# Patient Record
Sex: FEMALE | Race: WHITE | Employment: FULL TIME | URBAN - NONMETROPOLITAN AREA
[De-identification: names, ages, dates, MRNs, and addresses within clinical notes are randomized per-mention and may not be internally consistent; named-entity substitution may affect disease eponyms.]

---

## 2017-10-11 ENCOUNTER — OFFICE VISIT (OUTPATIENT)
Dept: FAMILY MEDICINE CLINIC | Age: 37
End: 2017-10-11

## 2017-10-11 VITALS
TEMPERATURE: 98.6 F | DIASTOLIC BLOOD PRESSURE: 86 MMHG | WEIGHT: 190.1 LBS | OXYGEN SATURATION: 96 % | HEIGHT: 66 IN | BODY MASS INDEX: 30.55 KG/M2 | SYSTOLIC BLOOD PRESSURE: 118 MMHG | HEART RATE: 98 BPM

## 2017-10-11 DIAGNOSIS — E89.41 HOT FLASHES DUE TO SURGICAL MENOPAUSE: ICD-10-CM

## 2017-10-11 DIAGNOSIS — N64.4 BREAST PAIN, RIGHT: ICD-10-CM

## 2017-10-11 DIAGNOSIS — M25.512 ACUTE PAIN OF LEFT SHOULDER: Primary | ICD-10-CM

## 2017-10-11 DIAGNOSIS — M25.612 DECREASED ROM OF LEFT SHOULDER: ICD-10-CM

## 2017-10-11 PROCEDURE — 99213 OFFICE O/P EST LOW 20 MIN: CPT | Performed by: NURSE PRACTITIONER

## 2017-10-11 RX ORDER — TRAMADOL HYDROCHLORIDE 50 MG/1
50 TABLET ORAL EVERY 8 HOURS PRN
Qty: 15 TABLET | Refills: 0 | Status: SHIPPED | OUTPATIENT
Start: 2017-10-11 | End: 2017-10-21

## 2017-10-11 RX ORDER — IBUPROFEN 800 MG/1
800 TABLET ORAL EVERY 8 HOURS PRN
Qty: 60 TABLET | Refills: 3 | Status: SHIPPED | OUTPATIENT
Start: 2017-10-11 | End: 2019-01-19 | Stop reason: ALTCHOICE

## 2017-10-11 ASSESSMENT — ENCOUNTER SYMPTOMS
SHORTNESS OF BREATH: 0
COUGH: 0

## 2017-10-11 NOTE — PROGRESS NOTES
Subjective  Chief Complaint   Patient presents with    Establish Care    Shoulder Pain     saw physician in [place], having LT shoulder pain for about 15 years    Breast Pain     RT breast, has been having pain for over 2 years, no noticed masses    Discuss Medications     hot flashes since hysterectomy     Establish care  Presents today to [facility]. Previous PCP was in [place]. Last EKG was normal, Last stress test was never Last mammogram was 2012. She would also like to discuss shoulder pain and right breast pain. Overall is doing well. Has no other questions or concerns at this time. Shoulder Pain    The pain is present in the left shoulder. This is a recurrent problem. The current episode started more than 1 year ago. There has been a history of trauma. The problem occurs constantly. The problem has been gradually worsening. The quality of the pain is described as pounding, sharp and aching. The pain is at a severity of 3/10. The pain is mild. Associated symptoms include a limited range of motion. Pertinent negatives include no fever, joint locking, joint swelling, numbness, stiffness or tingling. The symptoms are aggravated by activity. She has tried NSAIDS (Biofreeze, Ibuprofen 800mg) for the symptoms. The treatment provided mild relief. Was lifting a bag of mortar at work when she developed severe left shoulder pain. Aware that if being seen here, this will never be able to be submitted to worker's comp. Says she will not submit to worker's comp for this. Has also been having intermittent right breast pain. Says it is off and on, will notice it when she is in the shower or bumps it. Denies any lumps or bumps that she can notice. Does have implants so admits there are some \"ripples\". Has also been having hot flashes since her hysterectomy. Says she used to be on estradiol and it helped. There are no active problems to display for this patient.     Past Medical History: Diagnosis Date    Bacterial meningitis     Cancer ([facility]     uterine, ovarian    Von Willebrand disease ([facility]      Past Surgical History:   Procedure Laterality Date    BREAST ENHANCEMENT SURGERY  1999    HYSTERECTOMY, TOTAL ABDOMINAL  2007    LAPAROSCOPY       Family History   Problem Relation Age of Onset    Depression Mother     Diabetes Mother     Heart Disease Mother     High Blood Pressure Mother     High Cholesterol Mother     Asthma Mother     Heart Attack Father     Depression Brother     Diabetes Brother     High Cholesterol Brother     Heart Attack Paternal Aunt     Heart Attack Paternal Uncle     Depression Maternal Grandmother     Diabetes Maternal Grandmother     Heart Attack Maternal Grandmother     Heart Attack Maternal Grandfather     Depression Brother     Diabetes Brother      Social History     Social History    Marital status: Single     Spouse name: N/A    Number of children: N/A    Years of education: N/A     Social History Main Topics    Smoking status: Current Every Day Smoker     Types: Cigarettes     Start date: 10/11/1992    Smokeless tobacco: Former User    Alcohol use Yes      Comment: socially    Drug use: No    Sexual activity: No     Other Topics Concern    None     Social History Narrative    None     No current outpatient prescriptions on file prior to visit. No current facility-administered medications on file prior to visit. Allergies   Allergen Reactions    Acetomenaphthone (Menadiol Diacetate) [Menadiol Sodium Diphosphate]     Bee Venom     Penicillins        Review of Systems   Constitutional: Positive for diaphoresis. Negative for chills, fatigue and fever. Respiratory: Negative for cough and shortness of breath. Cardiovascular: Negative for chest pain, palpitations and leg swelling. Musculoskeletal: Positive for arthralgias (left shoulder). Negative for stiffness. Neurological: Negative for tingling and numbness. referral to Obstetrics / Gynecology   4. Decreased ROM of left shoulder  MRI Shoulder Left Wo Contrast     Xray and MRI left shoulder as ordered. Referral to OB to discuss hot flashes. Mammogram and Us of right breast as ordered to further evaluate tenderness. Ibuprofen TID PRN. Tramadol sparingly for pain. F/u PRN. Controlled Substances Monitoring:     Attestation: The Prescription Monitoring Report for this patient was reviewed today. (Rakesh Cobian CNP)  Documentation: No signs of potential drug abuse or diversion identified.  (Rakesh Cobian CNP)        Orders Placed This Encounter   Procedures    FRANK DIGITAL DIAGNOSTIC RIGHT     Standing Status:   Future     Standing Expiration Date:   12/11/2018     Order Specific Question:   Reason for exam:     Answer:   right breast pain and tenderness    US BREAST LIMITED RIGHT     Standing Status:   Future     Standing Expiration Date:   10/11/2018     Order Specific Question:   Reason for exam:     Answer:   right breast pain    XR SHOULDER LEFT (MIN 2 VIEWS)     Standing Status:   Future     Number of Occurrences:   1     Standing Expiration Date:   10/11/2018     Order Specific Question:   Reason for exam:     Answer:   left shoulder pain and injury    MRI Shoulder Left Wo Contrast     Standing Status:   Future     Standing Expiration Date:   10/11/2018     Order Specific Question:   Reason for exam:     Answer:   left shoulder pain and decreased rom   Deon Jarrett Ambulatory referral to Obstetrics / Gynecology     Referral Priority:   Routine     Referral Type:   Consult for Advice and Opinion     Referral Reason:   Specialty Services Required     Referred to Provider:   Adonis Oliveira MD     Requested Specialty:   Obstetrics & Gynecology     Number of Visits Requested:   1       Orders Placed This Encounter   Medications    ibuprofen (ADVIL;MOTRIN) 800 MG tablet     Sig: Take 1 tablet by mouth every 8 hours as needed for Pain     Dispense:  60 tablet Refill:  3    traMADol (ULTRAM) 50 MG tablet     Sig: Take 1 tablet by mouth every 8 hours as needed for Pain     Dispense:  15 tablet     Refill:  0       Return if symptoms worsen or fail to improve.     Krysta Toro, CNP

## 2017-10-12 ENCOUNTER — TELEPHONE (OUTPATIENT)
Dept: FAMILY MEDICINE CLINIC | Age: 37
End: 2017-10-12

## 2017-10-12 DIAGNOSIS — F40.240 CLAUSTROPHOBIA: Primary | ICD-10-CM

## 2017-10-12 NOTE — TELEPHONE ENCOUNTER
Pt calling they have her MRI scheduled on 10/24/2017 at 2 pm. They were talking with her and suggested she call for something to help her relax due to claustrophobia .  Pt uses [employer] pt can be reached at [phone]

## 2017-10-13 RX ORDER — LORAZEPAM 0.5 MG/1
0.5 TABLET ORAL ONCE
Qty: 1 TABLET | Refills: 0 | Status: SHIPPED | OUTPATIENT
Start: 2017-10-13 | End: 2017-10-13

## 2017-10-24 ENCOUNTER — HOSPITAL ENCOUNTER (OUTPATIENT)
Dept: ULTRASOUND IMAGING | Age: 37
Discharge: HOME | End: 2017-10-24
Payer: COMMERCIAL

## 2017-10-24 ENCOUNTER — HOSPITAL ENCOUNTER (OUTPATIENT)
Dept: WOMENS IMAGING | Age: 37
Discharge: HOME | End: 2017-10-24
Payer: COMMERCIAL

## 2017-10-24 ENCOUNTER — HOSPITAL ENCOUNTER (OUTPATIENT)
Dept: MRI IMAGING | Age: 37
Discharge: HOME | End: 2017-10-24
Payer: COMMERCIAL

## 2017-10-24 DIAGNOSIS — N64.4 BREAST PAIN, RIGHT: ICD-10-CM

## 2017-10-24 DIAGNOSIS — M25.612 DECREASED ROM OF LEFT SHOULDER: ICD-10-CM

## 2017-10-24 PROCEDURE — 76642 ULTRASOUND BREAST LIMITED: CPT

## 2017-10-24 PROCEDURE — G0279 TOMOSYNTHESIS, MAMMO: HCPCS

## 2017-10-24 NOTE — PROGRESS NOTES
Patient was unable to cooperate for MRI left shoulder. Patient was restless, unable to hold still, moving legs, lifting arm out of positioning  Cushion. Patient seems to appear to have ETOH on breath. This was apparent while conversing with patient and when she exhaled. MRI exam was not able to be completed due patients inability to hold still. While walking patient back to waiting area patient was unsteady in gait and almost fell over trying to put shoes back on. I asked patient if she was ok and she became angry stating \"I am not on drugs, my doctor only gave me .5 mg, I am not on drugs. \" Patient was walked back to Radiology waiting room where her friend Luz Singh was waiting for her to take her home.

## 2017-10-25 DIAGNOSIS — N64.4 BREAST PAIN, RIGHT: Primary | ICD-10-CM

## 2019-01-19 ENCOUNTER — HOSPITAL ENCOUNTER (OUTPATIENT)
Age: 39
Setting detail: OBSERVATION
Discharge: HOME | End: 2019-01-20
Attending: EMERGENCY MEDICINE | Admitting: INTERNAL MEDICINE
Payer: MEDICAID

## 2019-01-19 ENCOUNTER — APPOINTMENT (OUTPATIENT)
Dept: CT IMAGING | Age: 39
End: 2019-01-19
Payer: MEDICAID

## 2019-01-19 DIAGNOSIS — T18.9XXA SWALLOWED FOREIGN BODY, INITIAL ENCOUNTER: Primary | ICD-10-CM

## 2019-01-19 LAB
A/G RATIO: 1.6 (ref 1.1–2.2)
ALBUMIN SERPL-MCNC: 4.7 G/DL (ref 3.4–5)
ALP BLD-CCNC: 78 U/L (ref 40–129)
ALT SERPL-CCNC: 31 U/L (ref 10–40)
ANION GAP SERPL CALCULATED.3IONS-SCNC: 11 MMOL/L (ref 3–16)
AST SERPL-CCNC: 33 U/L (ref 15–37)
BASOPHILS ABSOLUTE: 0 K/UL (ref 0–0.2)
BASOPHILS RELATIVE PERCENT: 0.5 %
BILIRUB SERPL-MCNC: 1 MG/DL (ref 0–1)
BUN BLDV-MCNC: 6 MG/DL (ref 7–20)
CALCIUM SERPL-MCNC: 9.8 MG/DL (ref 8.3–10.6)
CHLORIDE BLD-SCNC: 103 MMOL/L (ref 99–110)
CO2: 25 MMOL/L (ref 21–32)
CREAT SERPL-MCNC: <0.5 MG/DL (ref 0.6–1.1)
EOSINOPHILS ABSOLUTE: 0.2 K/UL (ref 0–0.6)
EOSINOPHILS RELATIVE PERCENT: 3.4 %
GFR AFRICAN AMERICAN: >60
GFR NON-AFRICAN AMERICAN: >60
GLOBULIN: 3 G/DL
GLUCOSE BLD-MCNC: 89 MG/DL (ref 70–99)
HCG QUALITATIVE: NEGATIVE
HCT VFR BLD CALC: 46.3 % (ref 36–48)
HEMOGLOBIN: 15.8 G/DL (ref 12–16)
LIPASE: 64 U/L (ref 13–60)
LYMPHOCYTES ABSOLUTE: 1.1 K/UL (ref 1–5.1)
LYMPHOCYTES RELATIVE PERCENT: 23.5 %
MCH RBC QN AUTO: 36.5 PG (ref 26–34)
MCHC RBC AUTO-ENTMCNC: 34.1 G/DL (ref 31–36)
MCV RBC AUTO: 107 FL (ref 80–100)
MONOCYTES ABSOLUTE: 0.5 K/UL (ref 0–1.3)
MONOCYTES RELATIVE PERCENT: 10.6 %
NEUTROPHILS ABSOLUTE: 2.9 K/UL (ref 1.7–7.7)
NEUTROPHILS RELATIVE PERCENT: 62 %
PDW BLD-RTO: 13.3 % (ref 12.4–15.4)
PLATELET # BLD: 204 K/UL (ref 135–450)
PMV BLD AUTO: 7.2 FL (ref 5–10.5)
POTASSIUM REFLEX MAGNESIUM: 3.8 MMOL/L (ref 3.5–5.1)
RBC # BLD: 4.33 M/UL (ref 4–5.2)
SODIUM BLD-SCNC: 139 MMOL/L (ref 136–145)
TOTAL PROTEIN: 7.7 G/DL (ref 6.4–8.2)
WBC # BLD: 4.7 K/UL (ref 4–11)

## 2019-01-19 PROCEDURE — 6360000002 HC RX W HCPCS: Performed by: PHYSICIAN ASSISTANT

## 2019-01-19 PROCEDURE — 99285 EMERGENCY DEPT VISIT HI MDM: CPT

## 2019-01-19 PROCEDURE — 2580000003 HC RX 258: Performed by: PHYSICIAN ASSISTANT

## 2019-01-19 PROCEDURE — 6360000002 HC RX W HCPCS: Performed by: EMERGENCY MEDICINE

## 2019-01-19 PROCEDURE — 83690 ASSAY OF LIPASE: CPT

## 2019-01-19 PROCEDURE — 80053 COMPREHEN METABOLIC PANEL: CPT

## 2019-01-19 PROCEDURE — 74177 CT ABD & PELVIS W/CONTRAST: CPT

## 2019-01-19 PROCEDURE — 6360000004 HC RX CONTRAST MEDICATION: Performed by: EMERGENCY MEDICINE

## 2019-01-19 PROCEDURE — 96374 THER/PROPH/DIAG INJ IV PUSH: CPT

## 2019-01-19 PROCEDURE — 96376 TX/PRO/DX INJ SAME DRUG ADON: CPT

## 2019-01-19 PROCEDURE — C9113 INJ PANTOPRAZOLE SODIUM, VIA: HCPCS | Performed by: EMERGENCY MEDICINE

## 2019-01-19 PROCEDURE — 85025 COMPLETE CBC W/AUTO DIFF WBC: CPT

## 2019-01-19 PROCEDURE — G0378 HOSPITAL OBSERVATION PER HR: HCPCS

## 2019-01-19 PROCEDURE — 96375 TX/PRO/DX INJ NEW DRUG ADDON: CPT

## 2019-01-19 PROCEDURE — 84703 CHORIONIC GONADOTROPIN ASSAY: CPT

## 2019-01-19 RX ORDER — LORAZEPAM 2 MG/1
2 TABLET ORAL
Status: DISCONTINUED | OUTPATIENT
Start: 2019-01-19 | End: 2019-01-19

## 2019-01-19 RX ORDER — PANTOPRAZOLE SODIUM 40 MG/10ML
40 INJECTION, POWDER, LYOPHILIZED, FOR SOLUTION INTRAVENOUS DAILY
Status: DISCONTINUED | OUTPATIENT
Start: 2019-01-20 | End: 2019-01-20 | Stop reason: HOSPADM

## 2019-01-19 RX ORDER — POTASSIUM CHLORIDE 20 MEQ/1
40 TABLET, EXTENDED RELEASE ORAL PRN
Status: DISCONTINUED | OUTPATIENT
Start: 2019-01-19 | End: 2019-01-20 | Stop reason: HOSPADM

## 2019-01-19 RX ORDER — METOCLOPRAMIDE HYDROCHLORIDE 5 MG/ML
10 INJECTION INTRAMUSCULAR; INTRAVENOUS EVERY 6 HOURS
Status: DISCONTINUED | OUTPATIENT
Start: 2019-01-19 | End: 2019-01-20 | Stop reason: HOSPADM

## 2019-01-19 RX ORDER — MORPHINE SULFATE 4 MG/ML
4 INJECTION, SOLUTION INTRAMUSCULAR; INTRAVENOUS ONCE
Status: COMPLETED | OUTPATIENT
Start: 2019-01-19 | End: 2019-01-19

## 2019-01-19 RX ORDER — LORAZEPAM 2 MG/1
4 TABLET ORAL
Status: DISCONTINUED | OUTPATIENT
Start: 2019-01-19 | End: 2019-01-19

## 2019-01-19 RX ORDER — MORPHINE SULFATE 4 MG/ML
2 INJECTION, SOLUTION INTRAMUSCULAR; INTRAVENOUS EVERY 4 HOURS PRN
Status: COMPLETED | OUTPATIENT
Start: 2019-01-19 | End: 2019-01-19

## 2019-01-19 RX ORDER — POTASSIUM CHLORIDE 7.45 MG/ML
10 INJECTION INTRAVENOUS PRN
Status: DISCONTINUED | OUTPATIENT
Start: 2019-01-19 | End: 2019-01-20 | Stop reason: HOSPADM

## 2019-01-19 RX ORDER — LORAZEPAM 1 MG/1
1 TABLET ORAL
Status: DISCONTINUED | OUTPATIENT
Start: 2019-01-19 | End: 2019-01-19

## 2019-01-19 RX ORDER — MAGNESIUM SULFATE 1 G/100ML
1 INJECTION INTRAVENOUS PRN
Status: DISCONTINUED | OUTPATIENT
Start: 2019-01-19 | End: 2019-01-20 | Stop reason: HOSPADM

## 2019-01-19 RX ORDER — SODIUM CHLORIDE 0.9 % (FLUSH) 0.9 %
10 SYRINGE (ML) INJECTION PRN
Status: DISCONTINUED | OUTPATIENT
Start: 2019-01-19 | End: 2019-01-20 | Stop reason: HOSPADM

## 2019-01-19 RX ORDER — LORAZEPAM 2 MG/ML
2 INJECTION INTRAMUSCULAR
Status: DISCONTINUED | OUTPATIENT
Start: 2019-01-19 | End: 2019-01-19

## 2019-01-19 RX ORDER — PANTOPRAZOLE SODIUM 40 MG/10ML
80 INJECTION, POWDER, LYOPHILIZED, FOR SOLUTION INTRAVENOUS ONCE
Status: COMPLETED | OUTPATIENT
Start: 2019-01-19 | End: 2019-01-19

## 2019-01-19 RX ORDER — ONDANSETRON 2 MG/ML
4 INJECTION INTRAMUSCULAR; INTRAVENOUS
Status: DISCONTINUED | OUTPATIENT
Start: 2019-01-19 | End: 2019-01-19

## 2019-01-19 RX ORDER — LORAZEPAM 2 MG/ML
3 INJECTION INTRAMUSCULAR
Status: DISCONTINUED | OUTPATIENT
Start: 2019-01-19 | End: 2019-01-19

## 2019-01-19 RX ORDER — POTASSIUM CHLORIDE 20MEQ/15ML
40 LIQUID (ML) ORAL PRN
Status: DISCONTINUED | OUTPATIENT
Start: 2019-01-19 | End: 2019-01-20 | Stop reason: HOSPADM

## 2019-01-19 RX ORDER — LORAZEPAM 2 MG/ML
4 INJECTION INTRAMUSCULAR
Status: DISCONTINUED | OUTPATIENT
Start: 2019-01-19 | End: 2019-01-19

## 2019-01-19 RX ORDER — LORAZEPAM 2 MG/ML
1 INJECTION INTRAMUSCULAR
Status: DISCONTINUED | OUTPATIENT
Start: 2019-01-19 | End: 2019-01-19

## 2019-01-19 RX ORDER — SODIUM CHLORIDE 0.9 % (FLUSH) 0.9 %
10 SYRINGE (ML) INJECTION EVERY 12 HOURS SCHEDULED
Status: DISCONTINUED | OUTPATIENT
Start: 2019-01-19 | End: 2019-01-20 | Stop reason: HOSPADM

## 2019-01-19 RX ADMIN — MORPHINE SULFATE 2 MG: 4 INJECTION INTRAVENOUS at 13:38

## 2019-01-19 RX ADMIN — Medication 10 ML: at 21:10

## 2019-01-19 RX ADMIN — MORPHINE SULFATE 2 MG: 4 INJECTION INTRAVENOUS at 17:28

## 2019-01-19 RX ADMIN — METOCLOPRAMIDE 10 MG: 5 INJECTION, SOLUTION INTRAMUSCULAR; INTRAVENOUS at 13:38

## 2019-01-19 RX ADMIN — MORPHINE SULFATE 4 MG: 4 INJECTION INTRAVENOUS at 10:32

## 2019-01-19 RX ADMIN — PANTOPRAZOLE SODIUM 80 MG: 40 INJECTION, POWDER, FOR SOLUTION INTRAVENOUS at 12:52

## 2019-01-19 RX ADMIN — IOPAMIDOL 75 ML: 755 INJECTION, SOLUTION INTRAVENOUS at 10:54

## 2019-01-19 RX ADMIN — METOCLOPRAMIDE 10 MG: 5 INJECTION, SOLUTION INTRAMUSCULAR; INTRAVENOUS at 21:10

## 2019-01-19 ASSESSMENT — PAIN SCALES - GENERAL
PAINLEVEL_OUTOF10: 7
PAINLEVEL_OUTOF10: 10
PAINLEVEL_OUTOF10: 7
PAINLEVEL_OUTOF10: 0
PAINLEVEL_OUTOF10: 6
PAINLEVEL_OUTOF10: 7
PAINLEVEL_OUTOF10: 7

## 2019-01-19 ASSESSMENT — PAIN DESCRIPTION - LOCATION: LOCATION: ABDOMEN

## 2019-01-19 ASSESSMENT — PAIN DESCRIPTION - PAIN TYPE: TYPE: ACUTE PAIN

## 2019-01-19 ASSESSMENT — PAIN DESCRIPTION - ORIENTATION: ORIENTATION: RIGHT;LEFT

## 2019-01-20 ENCOUNTER — APPOINTMENT (OUTPATIENT)
Dept: GENERAL RADIOLOGY | Age: 39
End: 2019-01-20
Payer: MEDICAID

## 2019-01-20 ENCOUNTER — ANESTHESIA EVENT (OUTPATIENT)
Dept: ENDOSCOPY | Age: 39
End: 2019-01-20
Payer: MEDICAID

## 2019-01-20 ENCOUNTER — ANESTHESIA (OUTPATIENT)
Dept: ENDOSCOPY | Age: 39
End: 2019-01-20
Payer: MEDICAID

## 2019-01-20 VITALS — SYSTOLIC BLOOD PRESSURE: 118 MMHG | OXYGEN SATURATION: 99 % | DIASTOLIC BLOOD PRESSURE: 81 MMHG

## 2019-01-20 VITALS
TEMPERATURE: 97.1 F | BODY MASS INDEX: 23.63 KG/M2 | DIASTOLIC BLOOD PRESSURE: 94 MMHG | WEIGHT: 147 LBS | HEIGHT: 66 IN | RESPIRATION RATE: 16 BRPM | HEART RATE: 69 BPM | OXYGEN SATURATION: 99 % | SYSTOLIC BLOOD PRESSURE: 145 MMHG

## 2019-01-20 PROBLEM — F60.3 BORDERLINE PERSONALITY DISORDER (HCC): Chronic | Status: ACTIVE | Noted: 2019-01-20

## 2019-01-20 PROBLEM — R45.88 NON-SUICIDAL SELF HARM: Chronic | Status: ACTIVE | Noted: 2019-01-20

## 2019-01-20 PROBLEM — F60.3 BORDERLINE PERSONALITY DISORDER (HCC): Status: ACTIVE | Noted: 2019-01-20

## 2019-01-20 LAB
ANION GAP SERPL CALCULATED.3IONS-SCNC: 10 MMOL/L (ref 3–16)
BASOPHILS ABSOLUTE: 0 K/UL (ref 0–0.2)
BASOPHILS RELATIVE PERCENT: 0.6 %
BUN BLDV-MCNC: 7 MG/DL (ref 7–20)
CALCIUM SERPL-MCNC: 9.5 MG/DL (ref 8.3–10.6)
CHLORIDE BLD-SCNC: 104 MMOL/L (ref 99–110)
CO2: 24 MMOL/L (ref 21–32)
CREAT SERPL-MCNC: <0.5 MG/DL (ref 0.6–1.1)
EOSINOPHILS ABSOLUTE: 0.2 K/UL (ref 0–0.6)
EOSINOPHILS RELATIVE PERCENT: 4.1 %
GFR AFRICAN AMERICAN: >60
GFR NON-AFRICAN AMERICAN: >60
GLUCOSE BLD-MCNC: 77 MG/DL (ref 70–99)
HCT VFR BLD CALC: 41.4 % (ref 36–48)
HEMOGLOBIN: 14.2 G/DL (ref 12–16)
LYMPHOCYTES ABSOLUTE: 1.2 K/UL (ref 1–5.1)
LYMPHOCYTES RELATIVE PERCENT: 24.9 %
MAGNESIUM: 1.8 MG/DL (ref 1.8–2.4)
MCH RBC QN AUTO: 36.6 PG (ref 26–34)
MCHC RBC AUTO-ENTMCNC: 34.3 G/DL (ref 31–36)
MCV RBC AUTO: 106.5 FL (ref 80–100)
MONOCYTES ABSOLUTE: 0.5 K/UL (ref 0–1.3)
MONOCYTES RELATIVE PERCENT: 10.2 %
NEUTROPHILS ABSOLUTE: 3 K/UL (ref 1.7–7.7)
NEUTROPHILS RELATIVE PERCENT: 60.2 %
PDW BLD-RTO: 13.2 % (ref 12.4–15.4)
PLATELET # BLD: 158 K/UL (ref 135–450)
PMV BLD AUTO: 7.4 FL (ref 5–10.5)
POTASSIUM REFLEX MAGNESIUM: 3.4 MMOL/L (ref 3.5–5.1)
RBC # BLD: 3.89 M/UL (ref 4–5.2)
SODIUM BLD-SCNC: 138 MMOL/L (ref 136–145)
WBC # BLD: 5 K/UL (ref 4–11)

## 2019-01-20 PROCEDURE — G0500 MOD SEDAT ENDO SERVICE >5YRS: HCPCS | Performed by: INTERNAL MEDICINE

## 2019-01-20 PROCEDURE — 80048 BASIC METABOLIC PNL TOTAL CA: CPT

## 2019-01-20 PROCEDURE — 6370000000 HC RX 637 (ALT 250 FOR IP): Performed by: INTERNAL MEDICINE

## 2019-01-20 PROCEDURE — 3609012900 HC EGD FOREIGN BODY REMOVAL: Performed by: INTERNAL MEDICINE

## 2019-01-20 PROCEDURE — 6360000002 HC RX W HCPCS: Performed by: PHYSICIAN ASSISTANT

## 2019-01-20 PROCEDURE — 99152 MOD SED SAME PHYS/QHP 5/>YRS: CPT | Performed by: INTERNAL MEDICINE

## 2019-01-20 PROCEDURE — 6360000002 HC RX W HCPCS: Performed by: INTERNAL MEDICINE

## 2019-01-20 PROCEDURE — C9113 INJ PANTOPRAZOLE SODIUM, VIA: HCPCS | Performed by: PHYSICIAN ASSISTANT

## 2019-01-20 PROCEDURE — 7100000010 HC PHASE II RECOVERY - FIRST 15 MIN: Performed by: INTERNAL MEDICINE

## 2019-01-20 PROCEDURE — 85025 COMPLETE CBC W/AUTO DIFF WBC: CPT

## 2019-01-20 PROCEDURE — G0378 HOSPITAL OBSERVATION PER HR: HCPCS

## 2019-01-20 PROCEDURE — 6360000002 HC RX W HCPCS: Performed by: ANESTHESIOLOGY

## 2019-01-20 PROCEDURE — 74018 RADEX ABDOMEN 1 VIEW: CPT

## 2019-01-20 PROCEDURE — 2709999900 HC NON-CHARGEABLE SUPPLY: Performed by: INTERNAL MEDICINE

## 2019-01-20 PROCEDURE — 74019 RADEX ABDOMEN 2 VIEWS: CPT

## 2019-01-20 PROCEDURE — C1773 RET DEV, INSERTABLE: HCPCS | Performed by: INTERNAL MEDICINE

## 2019-01-20 PROCEDURE — 2500000003 HC RX 250 WO HCPCS: Performed by: ANESTHESIOLOGY

## 2019-01-20 PROCEDURE — 99255 IP/OBS CONSLTJ NEW/EST HI 80: CPT | Performed by: NURSE PRACTITIONER

## 2019-01-20 PROCEDURE — 99223 1ST HOSP IP/OBS HIGH 75: CPT | Performed by: INTERNAL MEDICINE

## 2019-01-20 PROCEDURE — 2720000010 HC SURG SUPPLY STERILE: Performed by: INTERNAL MEDICINE

## 2019-01-20 PROCEDURE — 7100000011 HC PHASE II RECOVERY - ADDTL 15 MIN: Performed by: INTERNAL MEDICINE

## 2019-01-20 PROCEDURE — 36415 COLL VENOUS BLD VENIPUNCTURE: CPT

## 2019-01-20 PROCEDURE — 83735 ASSAY OF MAGNESIUM: CPT

## 2019-01-20 RX ORDER — KETOROLAC TROMETHAMINE 30 MG/ML
15 INJECTION, SOLUTION INTRAMUSCULAR; INTRAVENOUS EVERY 6 HOURS PRN
Status: DISCONTINUED | OUTPATIENT
Start: 2019-01-20 | End: 2019-01-20 | Stop reason: HOSPADM

## 2019-01-20 RX ORDER — DEXAMETHASONE SODIUM PHOSPHATE 4 MG/ML
INJECTION, SOLUTION INTRA-ARTICULAR; INTRALESIONAL; INTRAMUSCULAR; INTRAVENOUS; SOFT TISSUE PRN
Status: DISCONTINUED | OUTPATIENT
Start: 2019-01-20 | End: 2019-01-20 | Stop reason: SDUPTHER

## 2019-01-20 RX ORDER — PROPOFOL 10 MG/ML
INJECTION, EMULSION INTRAVENOUS PRN
Status: DISCONTINUED | OUTPATIENT
Start: 2019-01-20 | End: 2019-01-20 | Stop reason: SDUPTHER

## 2019-01-20 RX ORDER — ONDANSETRON 2 MG/ML
INJECTION INTRAMUSCULAR; INTRAVENOUS PRN
Status: DISCONTINUED | OUTPATIENT
Start: 2019-01-20 | End: 2019-01-20 | Stop reason: SDUPTHER

## 2019-01-20 RX ORDER — POLYETHYLENE GLYCOL 3350 17 G/17G
17 POWDER, FOR SOLUTION ORAL DAILY
Status: DISCONTINUED | OUTPATIENT
Start: 2019-01-20 | End: 2019-01-20 | Stop reason: HOSPADM

## 2019-01-20 RX ORDER — LIDOCAINE HYDROCHLORIDE 20 MG/ML
INJECTION, SOLUTION EPIDURAL; INFILTRATION; INTRACAUDAL; PERINEURAL PRN
Status: DISCONTINUED | OUTPATIENT
Start: 2019-01-20 | End: 2019-01-20 | Stop reason: SDUPTHER

## 2019-01-20 RX ADMIN — PROPOFOL 50 MG: 10 INJECTION, EMULSION INTRAVENOUS at 09:36

## 2019-01-20 RX ADMIN — KETOROLAC TROMETHAMINE 15 MG: 30 INJECTION, SOLUTION INTRAMUSCULAR; INTRAVENOUS at 12:51

## 2019-01-20 RX ADMIN — POLYETHYLENE GLYCOL 3350 17 G: 17 POWDER, FOR SOLUTION ORAL at 14:00

## 2019-01-20 RX ADMIN — PROPOFOL 50 MG: 10 INJECTION, EMULSION INTRAVENOUS at 09:35

## 2019-01-20 RX ADMIN — METOCLOPRAMIDE 10 MG: 5 INJECTION, SOLUTION INTRAMUSCULAR; INTRAVENOUS at 01:17

## 2019-01-20 RX ADMIN — LIDOCAINE HYDROCHLORIDE 100 MG: 20 INJECTION, SOLUTION EPIDURAL; INFILTRATION; INTRACAUDAL; PERINEURAL at 09:33

## 2019-01-20 RX ADMIN — ONDANSETRON 4 MG: 2 INJECTION, SOLUTION INTRAMUSCULAR; INTRAVENOUS at 09:35

## 2019-01-20 RX ADMIN — DEXAMETHASONE SODIUM PHOSPHATE 8 MG: 4 INJECTION, SOLUTION INTRAMUSCULAR; INTRAVENOUS at 09:35

## 2019-01-20 RX ADMIN — PROPOFOL 50 MG: 10 INJECTION, EMULSION INTRAVENOUS at 09:39

## 2019-01-20 RX ADMIN — PANTOPRAZOLE SODIUM 40 MG: 40 INJECTION, POWDER, FOR SOLUTION INTRAVENOUS at 08:30

## 2019-01-20 RX ADMIN — METOCLOPRAMIDE 10 MG: 5 INJECTION, SOLUTION INTRAMUSCULAR; INTRAVENOUS at 14:00

## 2019-01-20 RX ADMIN — PROPOFOL 70 MG: 10 INJECTION, EMULSION INTRAVENOUS at 09:33

## 2019-01-20 RX ADMIN — METOCLOPRAMIDE 10 MG: 5 INJECTION, SOLUTION INTRAMUSCULAR; INTRAVENOUS at 07:26

## 2019-01-20 ASSESSMENT — PAIN SCALES - GENERAL: PAINLEVEL_OUTOF10: 5

## 2019-01-20 ASSESSMENT — LIFESTYLE VARIABLES: SMOKING_STATUS: 1

## 2019-01-20 ASSESSMENT — PAIN - FUNCTIONAL ASSESSMENT: PAIN_FUNCTIONAL_ASSESSMENT: 0-10

## 2019-01-21 PROCEDURE — 99238 HOSP IP/OBS DSCHRG MGMT 30/<: CPT | Performed by: INTERNAL MEDICINE

## 2019-01-25 ENCOUNTER — APPOINTMENT (OUTPATIENT)
Dept: CT IMAGING | Age: 39
End: 2019-01-25

## 2019-01-25 ENCOUNTER — HOSPITAL ENCOUNTER (EMERGENCY)
Age: 39
Discharge: HOME | End: 2019-01-25
Attending: EMERGENCY MEDICINE

## 2019-01-25 VITALS
OXYGEN SATURATION: 99 % | RESPIRATION RATE: 16 BRPM | DIASTOLIC BLOOD PRESSURE: 91 MMHG | WEIGHT: 150 LBS | HEART RATE: 59 BPM | TEMPERATURE: 98.8 F | BODY MASS INDEX: 24.11 KG/M2 | SYSTOLIC BLOOD PRESSURE: 135 MMHG | HEIGHT: 66 IN

## 2019-01-25 DIAGNOSIS — R51.9 ACUTE NONINTRACTABLE HEADACHE, UNSPECIFIED HEADACHE TYPE: ICD-10-CM

## 2019-01-25 DIAGNOSIS — S09.90XA INJURY OF HEAD, INITIAL ENCOUNTER: Primary | ICD-10-CM

## 2019-01-25 PROCEDURE — 70450 CT HEAD/BRAIN W/O DYE: CPT

## 2019-01-25 PROCEDURE — 6370000000 HC RX 637 (ALT 250 FOR IP): Performed by: EMERGENCY MEDICINE

## 2019-01-25 PROCEDURE — 99284 EMERGENCY DEPT VISIT MOD MDM: CPT

## 2019-01-25 PROCEDURE — 72125 CT NECK SPINE W/O DYE: CPT

## 2019-01-25 PROCEDURE — 70486 CT MAXILLOFACIAL W/O DYE: CPT

## 2019-01-25 RX ORDER — PENICILLIN V POTASSIUM 250 MG/1
250 TABLET ORAL 4 TIMES DAILY
COMMUNITY
End: 2019-01-25 | Stop reason: CLARIF

## 2019-01-25 RX ORDER — ONDANSETRON 4 MG/1
4 TABLET, ORALLY DISINTEGRATING ORAL ONCE
Status: DISCONTINUED | OUTPATIENT
Start: 2019-01-25 | End: 2019-01-26 | Stop reason: HOSPADM

## 2019-01-25 RX ORDER — IBUPROFEN 400 MG/1
400 TABLET ORAL ONCE
Status: COMPLETED | OUTPATIENT
Start: 2019-01-25 | End: 2019-01-25

## 2019-01-25 RX ADMIN — IBUPROFEN 400 MG: 400 TABLET ORAL at 23:22

## 2019-01-25 ASSESSMENT — PAIN DESCRIPTION - LOCATION: LOCATION: HEAD;NECK

## 2019-01-25 ASSESSMENT — PAIN DESCRIPTION - FREQUENCY: FREQUENCY: CONTINUOUS

## 2019-01-25 ASSESSMENT — PAIN DESCRIPTION - PAIN TYPE: TYPE: ACUTE PAIN

## 2019-01-25 ASSESSMENT — PAIN SCALES - GENERAL: PAINLEVEL_OUTOF10: 10

## 2020-05-13 ENCOUNTER — HOSPITAL ENCOUNTER (EMERGENCY)
Facility: HOSPITAL | Age: 40
Discharge: HOME | End: 2020-05-14
Attending: EMERGENCY MEDICINE | Admitting: EMERGENCY MEDICINE

## 2020-05-13 DIAGNOSIS — F17.200 TOBACCO DEPENDENCE: ICD-10-CM

## 2020-05-13 DIAGNOSIS — F10.920 ALCOHOLIC INTOXICATION WITHOUT COMPLICATION (HCC): Primary | ICD-10-CM

## 2020-05-13 DIAGNOSIS — F10.21 HISTORY OF ALCOHOLISM (HCC): ICD-10-CM

## 2020-05-13 DIAGNOSIS — F10.10 ALCOHOL ABUSE: ICD-10-CM

## 2020-05-13 LAB
ALBUMIN SERPL-MCNC: 4.4 G/DL (ref 3.5–5.2)
ALBUMIN/GLOB SERPL: 1.2 G/DL
ALP SERPL-CCNC: 108 U/L (ref 39–117)
ALT SERPL W P-5'-P-CCNC: 143 U/L (ref 1–33)
ANION GAP SERPL CALCULATED.3IONS-SCNC: 19 MMOL/L (ref 5–15)
AST SERPL-CCNC: 115 U/L (ref 1–32)
BASOPHILS # BLD AUTO: 0.1 10*3/MM3 (ref 0–0.2)
BASOPHILS NFR BLD AUTO: 1.5 % (ref 0–1.5)
BILIRUB SERPL-MCNC: <0.2 MG/DL (ref 0.2–1.2)
BUN BLD-MCNC: 9 MG/DL (ref 6–20)
BUN/CREAT SERPL: 12.5 (ref 7–25)
CALCIUM SPEC-SCNC: 8.9 MG/DL (ref 8.6–10.5)
CHLORIDE SERPL-SCNC: 104 MMOL/L (ref 98–107)
CO2 SERPL-SCNC: 23 MMOL/L (ref 22–29)
CREAT BLD-MCNC: 0.72 MG/DL (ref 0.57–1)
DEPRECATED RDW RBC AUTO: 47.8 FL (ref 37–54)
EOSINOPHIL # BLD AUTO: 0.13 10*3/MM3 (ref 0–0.4)
EOSINOPHIL NFR BLD AUTO: 2 % (ref 0.3–6.2)
ERYTHROCYTE [DISTWIDTH] IN BLOOD BY AUTOMATED COUNT: 13.5 % (ref 12.3–15.4)
ETHANOL BLD-MCNC: 281 MG/DL (ref 0–10)
ETHANOL BLD-MCNC: 375 MG/DL (ref 0–10)
GFR SERPL CREATININE-BSD FRML MDRD: 89 ML/MIN/1.73
GLOBULIN UR ELPH-MCNC: 3.6 GM/DL
GLUCOSE BLD-MCNC: 103 MG/DL (ref 65–99)
HCT VFR BLD AUTO: 44.8 % (ref 34–46.6)
HGB BLD-MCNC: 14.9 G/DL (ref 12–15.9)
HOLD SPECIMEN: NORMAL
HOLD SPECIMEN: NORMAL
IMM GRANULOCYTES # BLD AUTO: 0.3 10*3/MM3 (ref 0–0.05)
IMM GRANULOCYTES NFR BLD AUTO: 4.6 % (ref 0–0.5)
LIPASE SERPL-CCNC: 61 U/L (ref 13–60)
LYMPHOCYTES # BLD AUTO: 3.57 10*3/MM3 (ref 0.7–3.1)
LYMPHOCYTES NFR BLD AUTO: 54.2 % (ref 19.6–45.3)
MCH RBC QN AUTO: 32 PG (ref 26.6–33)
MCHC RBC AUTO-ENTMCNC: 33.3 G/DL (ref 31.5–35.7)
MCV RBC AUTO: 96.3 FL (ref 79–97)
MONOCYTES # BLD AUTO: 0.64 10*3/MM3 (ref 0.1–0.9)
MONOCYTES NFR BLD AUTO: 9.7 % (ref 5–12)
NEUTROPHILS # BLD AUTO: 1.85 10*3/MM3 (ref 1.7–7)
NEUTROPHILS NFR BLD AUTO: 28 % (ref 42.7–76)
NRBC BLD AUTO-RTO: 0 /100 WBC (ref 0–0.2)
PLATELET # BLD AUTO: 672 10*3/MM3 (ref 140–450)
PMV BLD AUTO: 8.6 FL (ref 6–12)
POTASSIUM BLD-SCNC: 4.1 MMOL/L (ref 3.5–5.2)
PROT SERPL-MCNC: 8 G/DL (ref 6–8.5)
RBC # BLD AUTO: 4.65 10*6/MM3 (ref 3.77–5.28)
SODIUM BLD-SCNC: 146 MMOL/L (ref 136–145)
WBC NRBC COR # BLD: 6.59 10*3/MM3 (ref 3.4–10.8)
WHOLE BLOOD HOLD SPECIMEN: NORMAL
WHOLE BLOOD HOLD SPECIMEN: NORMAL

## 2020-05-13 PROCEDURE — 85025 COMPLETE CBC W/AUTO DIFF WBC: CPT | Performed by: PHYSICIAN ASSISTANT

## 2020-05-13 PROCEDURE — 80307 DRUG TEST PRSMV CHEM ANLYZR: CPT

## 2020-05-13 PROCEDURE — 25010000002 MAGNESIUM SULFATE PER 500 MG OF MAGNESIUM: Performed by: PHYSICIAN ASSISTANT

## 2020-05-13 PROCEDURE — 80053 COMPREHEN METABOLIC PANEL: CPT | Performed by: PHYSICIAN ASSISTANT

## 2020-05-13 PROCEDURE — 25010000002 THIAMINE PER 100 MG: Performed by: PHYSICIAN ASSISTANT

## 2020-05-13 PROCEDURE — 96365 THER/PROPH/DIAG IV INF INIT: CPT

## 2020-05-13 PROCEDURE — 83690 ASSAY OF LIPASE: CPT | Performed by: PHYSICIAN ASSISTANT

## 2020-05-13 PROCEDURE — 99284 EMERGENCY DEPT VISIT MOD MDM: CPT

## 2020-05-13 PROCEDURE — 36415 COLL VENOUS BLD VENIPUNCTURE: CPT

## 2020-05-13 PROCEDURE — 80307 DRUG TEST PRSMV CHEM ANLYZR: CPT | Performed by: PHYSICIAN ASSISTANT

## 2020-05-13 RX ADMIN — FOLIC ACID 1000 ML/HR: 5 INJECTION, SOLUTION INTRAMUSCULAR; INTRAVENOUS; SUBCUTANEOUS at 21:45

## 2020-05-13 RX ADMIN — SODIUM CHLORIDE 1000 ML: 9 INJECTION, SOLUTION INTRAVENOUS at 21:54

## 2020-05-14 VITALS
DIASTOLIC BLOOD PRESSURE: 98 MMHG | BODY MASS INDEX: 32.14 KG/M2 | SYSTOLIC BLOOD PRESSURE: 157 MMHG | OXYGEN SATURATION: 97 % | HEART RATE: 91 BPM | WEIGHT: 200 LBS | HEIGHT: 66 IN | RESPIRATION RATE: 20 BRPM | TEMPERATURE: 98.2 F

## 2020-05-14 LAB
AMPHET+METHAMPHET UR QL: NEGATIVE
AMPHETAMINES UR QL: NEGATIVE
BARBITURATES UR QL SCN: NEGATIVE
BENZODIAZ UR QL SCN: POSITIVE
BUPRENORPHINE SERPL-MCNC: NEGATIVE NG/ML
CANNABINOIDS SERPL QL: NEGATIVE
COCAINE UR QL: NEGATIVE
ETHANOL BLD-MCNC: 165 MG/DL (ref 0–10)
METHADONE UR QL SCN: NEGATIVE
OPIATES UR QL: NEGATIVE
OXYCODONE UR QL SCN: NEGATIVE
PCP UR QL SCN: NEGATIVE
PROPOXYPH UR QL: NEGATIVE
TRICYCLICS UR QL SCN: NEGATIVE

## 2020-05-14 PROCEDURE — 25010000002 ONDANSETRON PER 1 MG: Performed by: EMERGENCY MEDICINE

## 2020-05-14 PROCEDURE — 96375 TX/PRO/DX INJ NEW DRUG ADDON: CPT

## 2020-05-14 PROCEDURE — 96361 HYDRATE IV INFUSION ADD-ON: CPT

## 2020-05-14 PROCEDURE — 36415 COLL VENOUS BLD VENIPUNCTURE: CPT

## 2020-05-14 PROCEDURE — 25010000002 LORAZEPAM PER 2 MG: Performed by: EMERGENCY MEDICINE

## 2020-05-14 PROCEDURE — 96376 TX/PRO/DX INJ SAME DRUG ADON: CPT

## 2020-05-14 PROCEDURE — 80307 DRUG TEST PRSMV CHEM ANLYZR: CPT | Performed by: PHYSICIAN ASSISTANT

## 2020-05-14 RX ORDER — ONDANSETRON 2 MG/ML
4 INJECTION INTRAMUSCULAR; INTRAVENOUS ONCE
Status: COMPLETED | OUTPATIENT
Start: 2020-05-14 | End: 2020-05-14

## 2020-05-14 RX ORDER — MULTIVITAMIN
1 CAPSULE ORAL DAILY
Qty: 30 CAPSULE | Refills: 0 | Status: SHIPPED | OUTPATIENT
Start: 2020-05-14 | End: 2020-06-13

## 2020-05-14 RX ORDER — FAMOTIDINE 10 MG/ML
20 INJECTION, SOLUTION INTRAVENOUS ONCE
Status: COMPLETED | OUTPATIENT
Start: 2020-05-14 | End: 2020-05-14

## 2020-05-14 RX ORDER — LORAZEPAM 2 MG/ML
0.5 INJECTION INTRAMUSCULAR ONCE
Status: COMPLETED | OUTPATIENT
Start: 2020-05-14 | End: 2020-05-14

## 2020-05-14 RX ORDER — LORAZEPAM 2 MG/ML
1 INJECTION INTRAMUSCULAR ONCE
Status: COMPLETED | OUTPATIENT
Start: 2020-05-14 | End: 2020-05-14

## 2020-05-14 RX ORDER — LORAZEPAM 2 MG/ML
1 INJECTION INTRAMUSCULAR ONCE
Status: DISCONTINUED | OUTPATIENT
Start: 2020-05-14 | End: 2020-05-14 | Stop reason: HOSPADM

## 2020-05-14 RX ORDER — PROMETHAZINE HYDROCHLORIDE 25 MG/1
25 SUPPOSITORY RECTAL EVERY 4 HOURS PRN
Qty: 10 SUPPOSITORY | Refills: 0 | Status: SHIPPED | OUTPATIENT
Start: 2020-05-14

## 2020-05-14 RX ORDER — ONDANSETRON 8 MG/1
8 TABLET, ORALLY DISINTEGRATING ORAL EVERY 8 HOURS PRN
Qty: 10 TABLET | Refills: 0 | Status: SHIPPED | OUTPATIENT
Start: 2020-05-14

## 2020-05-14 RX ORDER — SODIUM CHLORIDE 9 MG/ML
125 INJECTION, SOLUTION INTRAVENOUS CONTINUOUS
Status: DISCONTINUED | OUTPATIENT
Start: 2020-05-14 | End: 2020-05-14 | Stop reason: HOSPADM

## 2020-05-14 RX ORDER — FAMOTIDINE 20 MG/1
20 TABLET, FILM COATED ORAL 2 TIMES DAILY
Qty: 14 TABLET | Refills: 0 | Status: SHIPPED | OUTPATIENT
Start: 2020-05-14

## 2020-05-14 RX ADMIN — LORAZEPAM 0.5 MG: 2 INJECTION INTRAMUSCULAR; INTRAVENOUS at 10:35

## 2020-05-14 RX ADMIN — LORAZEPAM 1 MG: 2 INJECTION INTRAMUSCULAR; INTRAVENOUS at 02:15

## 2020-05-14 RX ADMIN — ONDANSETRON 4 MG: 2 INJECTION INTRAMUSCULAR; INTRAVENOUS at 07:57

## 2020-05-14 RX ADMIN — SODIUM CHLORIDE 125 ML/HR: 9 INJECTION, SOLUTION INTRAVENOUS at 02:29

## 2020-05-14 RX ADMIN — FAMOTIDINE 20 MG: 10 INJECTION INTRAVENOUS at 10:23

## 2020-05-14 RX ADMIN — LORAZEPAM 0.5 MG: 2 INJECTION INTRAMUSCULAR; INTRAVENOUS at 11:20

## 2020-05-14 NOTE — DISCHARGE INSTRUCTIONS
Rest and push plenty of fluids.  Use Zofran or Phenergan as needed for nausea    Take a multivitamin daily    Attempt to limit alcohol or quit drinking.  After your 14-day home quarantine since you have arrived from out of state please follow-up with the [facility].  If you have any other questions follow-up the [facility] outpatient evaluation center.  They are open 24 hours a day 7 days a week for drop in evaluations if you have any significant decompensation.  It is very important that you pursue alcohol cessation and we very much support your efforts and will help you all we can in coordination with any of the outpatient detox centers    Information is been given to you by our substance abuse counselor.  This presents additional options    Follow-up with 1 of the primary care providers from the list below    Return to the ED at once if you have any acute urgent emergent or significant symptoms as discussed, including but not limited to dehydration, significant withdrawal symptoms, or any other symptoms as discussed    Follow-up with 1 of the healthcare providers from the list below.  Call to make an appointment to establish care    Follow up with one of the physician centers below to setup primary care.    [facility], [phone], [address], [address], [place] [address]    [facility], [phone], [address] [facility] [address], [address] [place] [address], [phone], [address] [place] [address];     [facility], [phone], [address] [place] [address]

## 2020-05-14 NOTE — PROGRESS NOTES
Rounded on patient in ED. Patient states she is from [facility] and came to [place] to go to inpatient treatment at the [facility] and they will now not accept her due to traveling to [place] recently.  Patient stated she was in the hospital in [place] for an infection.  Patient stated she cannot stop drinking on he own and that she has been to several facilities including inpatient 30 day and detox facilities.  Educated patient on the disease.  Offered patient empathetic listening.  Patient was linked to [facility] for Telephone recovery and support services as well as [facility] in [place] for Inpatient 30 day treatment.  This writer advised thalia that she should follow up with an Intensive Outpatient Program since 30 day treatment has not been sufficient in the past.    JAVI Suárez, MSW  [place] Certified Adult Chemical Dependency/Mental Health  [phone]

## 2020-05-14 NOTE — PROGRESS NOTES
Continued Stay Note  [facility]     Patient Name: Ashley Fried  MRN: [id number]  Today's Date: 5/14/2020    Admit Date: 5/13/2020    Discharge Plan     Row Name 05/14/20 1448       Plan    Plan  :[facility] 30 day inpatient treatment    Plan Comments  Outreach:  Intake has been performed through [facility], the patient is en route to the facility in [place], [place]        Discharge Codes    No documentation.             Afia Lund RN ,BSN   Addiction Coordinator

## 2020-05-14 NOTE — PROGRESS NOTES
Continued Stay Note  [facility]     Patient Name: Ashley Fried  MRN: [id number]  Today's Date: 5/14/2020    Admit Date: 5/13/2020    Discharge Plan     Row Name 05/14/20 1137       Plan    Plan  [facility] inpatient AUD treatment    Plan Comments  Spoke with [facility] in [place], and thy checked with their Medical Director- they will proceed with intake for Ashley for 30 day inpatient AUD treatment.  She had been provided Telephone Recovery Support as well as outreach.  Dr. Mckeon is aware.  Thank you for contacting us.        Discharge Codes    No documentation.             Afia Lund RN ,BSN   Addiction Coordinator

## 2020-05-14 NOTE — ED PROVIDER NOTES
"Subjective   Ms. Ashley Fried is a 40 y.o. female who presents to the ED for medical clearance due to alcoholism. Patient states she has been drinking 1 gallon of vodka a day for the past 6 years. Patient reports she has had seizures due to alcohol withdrawals in the past and has been told she has fatty liver but no known history of cirrhosis. Her last detox was around one month ago at the [facility],  but reports she went \"straight to the liquor store\" when she left. Patient is currently intoxicated.  Last ETOH intake was about 1 hour prior to arrival.  Past medical history is significant for alcoholism, uterine cancer, status post hysterectomy, and self-harm.  After reviewing epic extensively, patient has had several foreign body ingestions status post removal with EGD.  She has been seen at many facilities in the [place] region.  Patient denies any current suicidal or homicidal ideations.      History provided by:  Patient  Drug / Alcohol Assessment   Primary symptoms include intoxication. This is a chronic problem. Suspected agents include alcohol. Pertinent negatives include no fever, no nausea and no vomiting.       Review of Systems   Constitutional: Negative for fever.   Respiratory: Negative for cough and shortness of breath.    Cardiovascular: Negative for chest pain and palpitations.   Gastrointestinal: Negative for abdominal pain, diarrhea, nausea and vomiting.   Genitourinary:        Status post hysterectomy secondary to uterine cancer.   Psychiatric/Behavioral: Negative for suicidal ideas.        Acute alcohol intoxication with history of alcoholism.  Patient also has history of self-harm.   All other systems reviewed and are negative.      Past Medical History:   Diagnosis Date   • Cancer (CMS/HCC)     OVARIAN AND BLADDER       Allergies   Allergen Reactions   • Tylenol [Acetaminophen] Anaphylaxis   • Morphine Other (See Comments)     STATES IT MAKES HER CRAZY   • Penicillins Unknown - Low Severity     " STATES MOM TOLD HER AS CHILD       Past Surgical History:   Procedure Laterality Date   • BREAST SURGERY     • HYSTERECTOMY         History reviewed. No pertinent family history.    Social History     Socioeconomic History   • Marital status: Unknown     Spouse name: Not on file   • Number of children: Not on file   • Years of education: Not on file   • Highest education level: Not on file   Tobacco Use   • Smoking status: Current Every Day Smoker     Packs/day: 1.00     Years: 25.00     Pack years: 25.00     Types: Cigarettes   Substance and Sexual Activity   • Alcohol use: Yes     Comment: 1 GALLON VODKA    • Drug use: Never   • Sexual activity: Defer         Objective   Physical Exam   Constitutional: She is oriented to person, place, and time. She appears well-developed and well-nourished.   HENT:   Head: Normocephalic and atraumatic.   Nose: Nose normal.   Eyes: Conjunctivae are normal. No scleral icterus.   Neck: Normal range of motion. Neck supple.   Cardiovascular: Regular rhythm and normal heart sounds.   No murmur heard.  Mild tachycardia.   Pulmonary/Chest: Effort normal and breath sounds normal. No respiratory distress.   Abdominal: Soft.   Central obesity. Mild tenderness to bilateral lower quadrants with palpation. No CVA or flank tenderness.    Musculoskeletal: Normal range of motion. She exhibits no edema.   Neurological: She is alert and oriented to person, place, and time.   Skin: Skin is warm and dry.   Psychiatric: Her mood appears anxious. She expresses no suicidal ideation. She expresses no suicidal plans.   Acute alcohol intoxication. Patient is anxious, tearful and irritable.   Nursing note and vitals reviewed.      Procedures         ED Course  ED Course as of May 16 0348   Thu May 14, 2020   0200 CBC was within normal limits.  Chemistries were also stable.  , , alk phos 108, and total bilirubin was less than 0.2.  UDS was positive for benzodiazepines.  Lipase was 61.  Initial  alcohol level was 375.  We gave patient a banana bag, as well as 1 L of normal saline.  Repeat alcohol level was 281.  We will repeat alcohol level at this time.    [FC]   0356 Repeat alcohol level was 165.  Patient is exhibiting no symptoms of alcohol withdrawal.  Vital signs are stable.  We paged [facility] mobile assessment worker for telephone interview with patient.    [FC]   0403 Mobile assessment worker tried to do the phone interview, but patient refused to talk at present.  She said that she was tired and did not feel good.  She also requested something to eat.  I went back in the room and said that she is going to talk to [facility] now if she wants detox.  If she does not wish to have detox, she will be discharged to home.  Patient says that she does wish to have detox, so I advised that she will be talking to the mobile assessment worker at this time.  She is now agreeable.    [FC]   06 Patient resting comfortably, she has been up walking back and forth to the bathroom without issue.  We have [facility], they have had trouble securing someone to do a mobile assessment on the patient which has significantly prolonged her stay in the emergency department.    [AP]   1008 Patient slept comfortably in the ED.  She is serially reexamined throughout the ED course.  She reported being nauseated and I treated her with a dose of Zofran.  She reports that she was shaky.  On last reevaluation now she does not have significant trauma.We discussed treatment at the [facility].  Mobile assessment however reports that the patient's been out of state and therefore cannot be admitted to the [facility] within 14 days.I discussed the case with Dr. Weinberg, her hospitalist who reports that at the current time the patient does not meet admission criteria, and we are not a detox facility.I discussed this with the patient and we discussed multiple options.  The patient does not have  acute withdrawal symptoms currently.  We will treat her with IV Pepcid.  If she is nauseated we will treat her with IV Compazine as well.  She reports she has a friend that is going to come pick her up.  I discussed outpatient treatment and evaluation as well.  The patient's ate well since she has been here with no further emesis    [HH]   1035 Had a long discussion with the patient at the bedside.  I discussed my preference that we seek inpatient detox however because she just got to [place] yesterday, she must be quarantined for 14 days before an inpatient stay for detox per the [facility].  [facility] has no other resources that they can give the patient or me for acute treatment.  After discussion the patient reports that she will probably just start drinking again.  I discussed other options for outpatient treatment but it appears that these are constrained by the same COVID quarantine guidelines.I discussed treatment with Phenergan when she goes home.  I will treat her with another dose of Ativan to tide her over briefly as her intent is to start drinking again, and this also happened by her report immediately after her last inpatient detox as well.We discussed indications for immediate return.  I discussed that I fully supported her efforts to stop drinking and strongly suggested and encouraged her to pursue this at the earliest possible time that she could.Patient has been very pleasant, very understanding, and understands the options available to her.  She understands indications for immediate return to the ED, and understands outpatient treatment options as well.  She is treated with IV vitamin supplementation as well as antiemetics and Pepcid in the ED.  She has had no emesis throughout the long ED and is eating well    [HH]   1133 Our  came down to the ED and saw the patient.  Despite declines based on a recent travel from other institutions of [facility] and [facility] has accepted the  patient.  She will be discharged from [facility] and will go directly for outpatient screening in [place] now for consideration for inpatient treatment.    [HH]      ED Course User Index  [AP] Gunnar Guevara DO  [FC] Eva Butterfield PA-C  [HH] You Mckeon MD     No results found for this or any previous visit (from the past 24 hour(s)).  Note: In addition to lab results from this visit, the labs listed above may include labs taken at another facility or during a different encounter within the last 24 hours. Please correlate lab times with ED admission and discharge times for further clarification of the services performed during this visit.    No orders to display     Vitals:    05/14/20 0515 05/14/20 1000 05/14/20 1015 05/14/20 1030   BP:  144/87  157/98   Pulse: (!) 122 91     Resp:  20     Temp:       TempSrc:       SpO2: 90% 98% 97%    Weight:       Height:         Medications   sodium chloride 0.9 % bolus 1,000 mL (0 mL Intravenous Stopped 5/13/20 2254)   multiple vitamin (M.V.I. Adult) 10 mL, thiamine (B-1) 100 mg, folic acid 1 mg, magnesium sulfate 2 g in sodium chloride 0.9 % 1,000 mL infusion (0 mL/hr Intravenous Stopped 5/13/20 2300)   LORazepam (ATIVAN) injection 1 mg (1 mg Intravenous Given 5/14/20 0215)   ondansetron (ZOFRAN) injection 4 mg (4 mg Intravenous Given 5/14/20 0757)   famotidine (PEPCID) injection 20 mg (20 mg Intravenous Given 5/14/20 1023)   LORazepam (ATIVAN) injection 0.5 mg (0.5 mg Intravenous Given 5/14/20 1035)   LORazepam (ATIVAN) injection 0.5 mg (0.5 mg Intravenous Given 5/14/20 1120)     ECG/EMG Results (last 24 hours)     ** No results found for the last 24 hours. **        No orders to display                                              MDM    Final diagnoses:   Alcoholic intoxication without complication (CMS/HCC)   History of alcoholism (CMS/HCC)   Tobacco dependence   Alcohol abuse       Documentation assistance provided by dmitry Foley.   Information recorded by the scribe was done at my direction and has been verified and validated by me.     Darion Foley  05/13/20 2058       Darion Foley  05/13/20 2058       Eva Butterfield PA-C  05/16/20 0345

## 2020-09-14 ENCOUNTER — HOSPITAL ENCOUNTER (OUTPATIENT)
Dept: GASTROENTEROLOGY | Facility: HOSPITAL | Age: 40
Setting detail: HOSPITAL OUTPATIENT SURGERY
Discharge: HOME | End: 2020-09-14
Attending: INTERNAL MEDICINE

## 2023-06-01 ENCOUNTER — HOSPITAL ENCOUNTER (EMERGENCY)
Facility: HOSPITAL | Age: 43
Discharge: HOME | End: 2023-06-01
Attending: EMERGENCY MEDICINE
Payer: COMMERCIAL

## 2023-06-01 VITALS
WEIGHT: 210 LBS | HEIGHT: 66 IN | DIASTOLIC BLOOD PRESSURE: 68 MMHG | RESPIRATION RATE: 17 BRPM | BODY MASS INDEX: 33.75 KG/M2 | OXYGEN SATURATION: 90 % | TEMPERATURE: 98.3 F | HEART RATE: 90 BPM | SYSTOLIC BLOOD PRESSURE: 128 MMHG

## 2023-06-01 DIAGNOSIS — R60.0 PEDAL EDEMA: Primary | ICD-10-CM

## 2023-06-01 LAB
ALBUMIN SERPL-MCNC: 3.2 G/DL (ref 3.5–5.2)
ALBUMIN/GLOB SERPL: 0.9 G/DL
ALP SERPL-CCNC: 171 U/L (ref 39–117)
ALT SERPL W P-5'-P-CCNC: 22 U/L (ref 1–33)
ANION GAP SERPL CALCULATED.3IONS-SCNC: 11 MMOL/L (ref 5–15)
AST SERPL-CCNC: 28 U/L (ref 1–32)
BASOPHILS # BLD AUTO: 0.09 10*3/MM3 (ref 0–0.2)
BASOPHILS NFR BLD AUTO: 1.4 % (ref 0–1.5)
BILIRUB SERPL-MCNC: 0.4 MG/DL (ref 0–1.2)
BUN SERPL-MCNC: 5 MG/DL (ref 6–20)
BUN/CREAT SERPL: 10.4 (ref 7–25)
CALCIUM SPEC-SCNC: 8.7 MG/DL (ref 8.6–10.5)
CHLORIDE SERPL-SCNC: 104 MMOL/L (ref 98–107)
CO2 SERPL-SCNC: 19 MMOL/L (ref 22–29)
CREAT SERPL-MCNC: 0.48 MG/DL (ref 0.57–1)
DEPRECATED RDW RBC AUTO: 61.1 FL (ref 37–54)
EGFRCR SERPLBLD CKD-EPI 2021: 120 ML/MIN/1.73
EOSINOPHIL # BLD AUTO: 0.17 10*3/MM3 (ref 0–0.4)
EOSINOPHIL NFR BLD AUTO: 2.7 % (ref 0.3–6.2)
ERYTHROCYTE [DISTWIDTH] IN BLOOD BY AUTOMATED COUNT: 15.4 % (ref 12.3–15.4)
GLOBULIN UR ELPH-MCNC: 3.4 GM/DL
GLUCOSE SERPL-MCNC: 90 MG/DL (ref 65–99)
HCT VFR BLD AUTO: 41.5 % (ref 34–46.6)
HGB BLD-MCNC: 11.9 G/DL (ref 12–15.9)
IMM GRANULOCYTES # BLD AUTO: 0.07 10*3/MM3 (ref 0–0.05)
IMM GRANULOCYTES NFR BLD AUTO: 1.1 % (ref 0–0.5)
LYMPHOCYTES # BLD AUTO: 1.48 10*3/MM3 (ref 0.7–3.1)
LYMPHOCYTES NFR BLD AUTO: 23.3 % (ref 19.6–45.3)
MCH RBC QN AUTO: 31.5 PG (ref 26.6–33)
MCHC RBC AUTO-ENTMCNC: 28.7 G/DL (ref 31.5–35.7)
MCV RBC AUTO: 109.8 FL (ref 79–97)
MONOCYTES # BLD AUTO: 0.95 10*3/MM3 (ref 0.1–0.9)
MONOCYTES NFR BLD AUTO: 14.9 % (ref 5–12)
NEUTROPHILS NFR BLD AUTO: 3.6 10*3/MM3 (ref 1.7–7)
NEUTROPHILS NFR BLD AUTO: 56.6 % (ref 42.7–76)
NRBC BLD AUTO-RTO: 0 /100 WBC (ref 0–0.2)
NT-PROBNP SERPL-MCNC: 320 PG/ML (ref 0–450)
PLATELET # BLD AUTO: 268 10*3/MM3 (ref 140–450)
PMV BLD AUTO: 8.7 FL (ref 6–12)
POTASSIUM SERPL-SCNC: 4.8 MMOL/L (ref 3.5–5.2)
PROT SERPL-MCNC: 6.6 G/DL (ref 6–8.5)
RBC # BLD AUTO: 3.78 10*6/MM3 (ref 3.77–5.28)
SODIUM SERPL-SCNC: 134 MMOL/L (ref 136–145)
WBC NRBC COR # BLD: 6.36 10*3/MM3 (ref 3.4–10.8)

## 2023-06-01 PROCEDURE — 36415 COLL VENOUS BLD VENIPUNCTURE: CPT

## 2023-06-01 PROCEDURE — 83880 ASSAY OF NATRIURETIC PEPTIDE: CPT

## 2023-06-01 PROCEDURE — 80053 COMPREHEN METABOLIC PANEL: CPT

## 2023-06-01 PROCEDURE — 99283 EMERGENCY DEPT VISIT LOW MDM: CPT

## 2023-06-01 PROCEDURE — 85025 COMPLETE CBC W/AUTO DIFF WBC: CPT

## 2023-06-01 RX ORDER — BUPRENORPHINE HYDROCHLORIDE AND NALOXONE HYDROCHLORIDE DIHYDRATE 8; 2 MG/1; MG/1
2 TABLET SUBLINGUAL DAILY
COMMUNITY

## 2023-06-01 RX ORDER — FUROSEMIDE 20 MG/1
20 TABLET ORAL DAILY
Qty: 5 TABLET | Refills: 0 | Status: SHIPPED | OUTPATIENT
Start: 2023-06-01 | End: 2023-06-06

## 2023-06-01 RX ORDER — FUROSEMIDE 20 MG/1
20 TABLET ORAL ONCE
Status: COMPLETED | OUTPATIENT
Start: 2023-06-01 | End: 2023-06-01

## 2023-06-01 RX ORDER — TRAZODONE HYDROCHLORIDE 50 MG/1
50 TABLET ORAL NIGHTLY
COMMUNITY

## 2023-06-01 RX ORDER — POTASSIUM CHLORIDE 750 MG/1
10 TABLET, FILM COATED, EXTENDED RELEASE ORAL DAILY
Qty: 5 TABLET | Refills: 0 | Status: SHIPPED | OUTPATIENT
Start: 2023-06-01 | End: 2023-06-06

## 2023-06-01 RX ADMIN — FUROSEMIDE 20 MG: 20 TABLET ORAL at 12:37

## 2023-06-01 NOTE — ED PROVIDER NOTES
Time: 9:46 AM EDT  Date of encounter:  6/1/2023  Independent Historian/Clinical History and Information was obtained by:   Patient  Chief Complaint   Patient presents with   • Leg Swelling       History is limited by: N/A    History of Present Illness:  Patient is a 43 y.o. year old female who presents to the emergency department for evaluation of bilateral leg swelling.  Patient states this has been present for approximately the last 7 days.  Patient denies any chest pain or shortness of breath.  Patient denies any change in color. NO history of DVT or PE. [facility] is [facility] in [facility].  She reports that she has been standing and walking significantly more than usual over the past week.    HPI    Patient Care Team  Primary Care Provider: Provider, No Known    Past Medical History:     Allergies   Allergen Reactions   • Tylenol [Acetaminophen] Anaphylaxis   • Morphine Other (See Comments)     STATES IT MAKES HER CRAZY   • Penicillins Unknown - Low Severity     STATES MOM TOLD HER AS CHILD     Past Medical History:   Diagnosis Date   • Cancer     OVARIAN AND BLADDER     Past Surgical History:   Procedure Laterality Date   • BREAST SURGERY     • HYSTERECTOMY       History reviewed. No pertinent family history.    Home Medications:  Prior to Admission medications    Medication Sig Start Date End Date Taking? Authorizing Provider   buprenorphine-naloxone (SUBOXONE) 8-2 MG per SL tablet Place 1 tablet under the tongue Daily.    Provider, MD Irish   famotidine (PEPCID) 20 MG tablet Take 1 tablet by mouth 2 (Two) Times a Day. 5/14/20   You Mckeon MD   melatonin 5 MG sublingual tablet sublingual tablet Place  under the tongue.    Provider, MD Irish   ondansetron ODT (ZOFRAN-ODT) 8 MG disintegrating tablet Place 1 tablet on the tongue Every 8 (Eight) Hours As Needed for Nausea or Vomiting. 5/14/20   You Mckeon MD   promethazine (PHENERGAN) 25 MG suppository Insert  "1 suppository into the rectum Every 4 (Four) Hours As Needed for Nausea or Vomiting. 5/14/20   You Mckeon MD   traZODone (DESYREL) 50 MG tablet Take 1 tablet by mouth Every Night.    Provider, MD Irish        Social History:   Social History     Tobacco Use   • Smoking status: Every Day     Packs/day: 1.00     Years: 25.00     Pack years: 25.00     Types: Cigarettes   Substance Use Topics   • Alcohol use: Not Currently     Comment: Soder since 5-19-23   • Drug use: Not Currently         Review of Systems:  Review of Systems   Respiratory: Negative.    Cardiovascular: Positive for leg swelling.        Physical Exam:  /68   Pulse 90   Temp 98.3 °F (36.8 °C) (Oral)   Resp 17   Ht 167.6 cm (66\")   Wt 95.3 kg (210 lb)   SpO2 90%   BMI 33.89 kg/m²     Physical Exam  Vitals and nursing note reviewed.   Constitutional:       Appearance: Normal appearance.   HENT:      Head: Normocephalic.   Cardiovascular:      Rate and Rhythm: Normal rate and regular rhythm.      Heart sounds: Normal heart sounds.   Pulmonary:      Effort: Pulmonary effort is normal.      Breath sounds: Normal breath sounds.   Abdominal:      General: Abdomen is flat.      Palpations: Abdomen is soft.   Musculoskeletal:         General: Normal range of motion.      Cervical back: Normal range of motion.      Right lower leg: Edema present.      Left lower leg: Edema present.      Comments: +2 nonpitting edema of the ankles and feet   Skin:     General: Skin is warm and dry.   Neurological:      General: No focal deficit present.      Mental Status: She is alert and oriented to person, place, and time.   Psychiatric:         Mood and Affect: Mood normal.                  Procedures:  Procedures      Medical Decision Making:      Comorbidities that affect care:    Cancer    External Notes reviewed:    None      The following orders were placed and all results were independently analyzed by me:  Orders Placed This Encounter "   Procedures   • Comprehensive Metabolic Panel   • BNP   • CBC Auto Differential   • CBC & Differential       Medications Given in the Emergency Department:  Medications   furosemide (LASIX) tablet 20 mg (20 mg Oral Given 6/1/23 1237)        ED Course:    The patient was initially evaluated in the triage area where orders were placed. The patient was later dispositioned by Lucho Gusman DO.      The patient was advised to stay for completion of workup which includes but is not limited to communication of labs and radiological results, reassessment and plan. The patient was advised that leaving prior to disposition by a provider could result in critical findings that are not communicated to the patient.          Labs:    Lab Results (last 24 hours)     Procedure Component Value Units Date/Time    CBC & Differential [[id number]]  (Abnormal) Collected: 06/01/23 1041    Specimen: Blood from Arm, Right Updated: 06/01/23 1052    Narrative:      The following orders were created for panel order CBC & Differential.  Procedure                               Abnormality         Status                     ---------                               -----------         ------                     CBC Auto Differential[[id number]]        Abnormal            Final result                 Please view results for these tests on the individual orders.    Comprehensive Metabolic Panel [[id number]]  (Abnormal) Collected: 06/01/23 1041    Specimen: Blood from Arm, Right Updated: 06/01/23 1123     Glucose 90 mg/dL      BUN 5 mg/dL      Creatinine 0.48 mg/dL      Sodium 134 mmol/L      Potassium 4.8 mmol/L      Comment: Slight hemolysis detected by analyzer. Results may be affected.        Chloride 104 mmol/L      CO2 19.0 mmol/L      Calcium 8.7 mg/dL      Total Protein 6.6 g/dL      Albumin 3.2 g/dL      ALT (SGPT) 22 U/L      AST (SGOT) 28 U/L      Alkaline Phosphatase 171 U/L      Total Bilirubin 0.4 mg/dL      Globulin 3.4 gm/dL      A/G Ratio  0.9 g/dL      BUN/Creatinine Ratio 10.4     Anion Gap 11.0 mmol/L      eGFR 120.0 mL/min/1.73     Narrative:      GFR Normal >60  Chronic Kidney Disease <60  Kidney Failure <15      BNP [[id number]]  (Normal) Collected: 06/01/23 1041    Specimen: Blood from Arm, Right Updated: 06/01/23 1119     proBNP 320.0 pg/mL     Narrative:      Among patients with dyspnea, NT-proBNP is highly sensitive for the detection of acute congestive heart failure. In addition NT-proBNP of <300 pg/ml effectively rules out acute congestive heart failure with 99% negative predictive value.      CBC Auto Differential [[id number]]  (Abnormal) Collected: 06/01/23 1041    Specimen: Blood from Arm, Right Updated: 06/01/23 1052     WBC 6.36 10*3/mm3      RBC 3.78 10*6/mm3      Hemoglobin 11.9 g/dL      Hematocrit 41.5 %      .8 fL      MCH 31.5 pg      MCHC 28.7 g/dL      RDW 15.4 %      RDW-SD 61.1 fl      MPV 8.7 fL      Platelets 268 10*3/mm3      Neutrophil % 56.6 %      Lymphocyte % 23.3 %      Monocyte % 14.9 %      Eosinophil % 2.7 %      Basophil % 1.4 %      Immature Grans % 1.1 %      Neutrophils, Absolute 3.60 10*3/mm3      Lymphocytes, Absolute 1.48 10*3/mm3      Monocytes, Absolute 0.95 10*3/mm3      Eosinophils, Absolute 0.17 10*3/mm3      Basophils, Absolute 0.09 10*3/mm3      Immature Grans, Absolute 0.07 10*3/mm3      nRBC 0.0 /100 WBC            Imaging:    No Radiology Exams Resulted Within Past 24 Hours      Differential Diagnosis and Discussion:      Extremity Pain: Differential diagnosis includes but is not limited to soft tissue sprain, tendonitis, tendon injury, dislocation, fracture, deep vein thrombosis, arterial insufficiency, osteoarthritis, bursitis, and ligamentous damage.    All labs were reviewed and interpreted by me.    MDM   43-year-old female patient who is in substance abuse rehab presents with swelling of her ankles and feet.  She states she is walking and standing more than normal over the past week  that she has been there.  Patient's work-up is negative for any acute findings.  There is no evidence for infection or DVT on clinical exam.  She was given Lasix in the emergency department stable for discharge with a prescription for 5 days of Lasix.      Patient Care Considerations:        Consultants/Shared Management Plan:    None    Social Determinants of Health:    Patient is independent, reliable, and has access to care.       Disposition and Care Coordination:    Discharged: The patient is suitable and stable for discharge with no need for consideration of observation or admission.    I have explained discharge medications and the need for follow up with the patient/caretakers. This was also printed in the discharge instructions. Patient was discharged with the following medications and follow up:      Medication List      New Prescriptions    furosemide 20 MG tablet  Commonly known as: LASIX  Take 1 tablet by mouth Daily.     potassium chloride 10 MEQ CR tablet  Take 1 tablet by mouth Daily.           Where to Get Your Medications      These medications [place] [facility] [place] [address] [address] - [phone] [facility] [place] [phone]   [address] [address]    Phone: [phone]   · furosemide 20 MG tablet  · potassium chloride 10 MEQ CR tablet      Provider, No Known  [place] [id number]      As needed       Final diagnoses:   Pedal edema        ED Disposition     ED Disposition   Discharge    Condition   Stable    Comment   --             This medical record created using voice recognition software.           Lucho Gusman DO  06/01/23 6757

## 2023-06-06 ENCOUNTER — APPOINTMENT (OUTPATIENT)
Dept: CARDIOLOGY | Facility: HOSPITAL | Age: 43
End: 2023-06-06
Payer: COMMERCIAL

## 2023-06-06 ENCOUNTER — APPOINTMENT (OUTPATIENT)
Dept: CT IMAGING | Facility: HOSPITAL | Age: 43
End: 2023-06-06
Payer: COMMERCIAL

## 2023-06-06 ENCOUNTER — ANESTHESIA (OUTPATIENT)
Dept: PERIOP | Facility: HOSPITAL | Age: 43
End: 2023-06-06
Payer: COMMERCIAL

## 2023-06-06 ENCOUNTER — APPOINTMENT (OUTPATIENT)
Dept: GENERAL RADIOLOGY | Facility: HOSPITAL | Age: 43
End: 2023-06-06
Payer: COMMERCIAL

## 2023-06-06 ENCOUNTER — ANESTHESIA EVENT (OUTPATIENT)
Dept: PERIOP | Facility: HOSPITAL | Age: 43
End: 2023-06-06
Payer: COMMERCIAL

## 2023-06-06 ENCOUNTER — HOSPITAL ENCOUNTER (OUTPATIENT)
Facility: HOSPITAL | Age: 43
Setting detail: OBSERVATION
Discharge: NURSING, REHAB OR LONG-TERM CARE | End: 2023-06-08
Attending: EMERGENCY MEDICINE | Admitting: FAMILY MEDICINE
Payer: COMMERCIAL

## 2023-06-06 DIAGNOSIS — T18.2XXA FOREIGN BODY IN STOMACH: Primary | ICD-10-CM

## 2023-06-06 DIAGNOSIS — Z78.9 DECREASED ACTIVITIES OF DAILY LIVING (ADL): ICD-10-CM

## 2023-06-06 DIAGNOSIS — T18.2XXA FOREIGN BODY IN STOMACH, INITIAL ENCOUNTER: ICD-10-CM

## 2023-06-06 LAB
ALBUMIN SERPL-MCNC: 3.7 G/DL (ref 3.5–5.2)
ALBUMIN/GLOB SERPL: 1.2 G/DL
ALP SERPL-CCNC: 185 U/L (ref 39–117)
ALT SERPL W P-5'-P-CCNC: 12 U/L (ref 1–33)
ANION GAP SERPL CALCULATED.3IONS-SCNC: 10.9 MMOL/L (ref 5–15)
AST SERPL-CCNC: 17 U/L (ref 1–32)
BACTERIA UR QL AUTO: ABNORMAL /HPF
BASOPHILS # BLD AUTO: 0.02 10*3/MM3 (ref 0–0.2)
BASOPHILS NFR BLD AUTO: 0.4 % (ref 0–1.5)
BH CV LOWER VASCULAR LEFT COMMON FEMORAL AUGMENT: NORMAL
BH CV LOWER VASCULAR LEFT COMMON FEMORAL COMPETENT: NORMAL
BH CV LOWER VASCULAR LEFT COMMON FEMORAL COMPRESS: NORMAL
BH CV LOWER VASCULAR LEFT COMMON FEMORAL PHASIC: NORMAL
BH CV LOWER VASCULAR LEFT COMMON FEMORAL SPONT: NORMAL
BH CV LOWER VASCULAR LEFT DISTAL FEMORAL COMPRESS: NORMAL
BH CV LOWER VASCULAR LEFT GASTRONEMIUS COMPRESS: NORMAL
BH CV LOWER VASCULAR LEFT GREATER SAPH AK COMPRESS: NORMAL
BH CV LOWER VASCULAR LEFT GREATER SAPH BK COMPRESS: NORMAL
BH CV LOWER VASCULAR LEFT LESSER SAPH COMPRESS: NORMAL
BH CV LOWER VASCULAR LEFT MID FEMORAL AUGMENT: NORMAL
BH CV LOWER VASCULAR LEFT MID FEMORAL COMPETENT: NORMAL
BH CV LOWER VASCULAR LEFT MID FEMORAL COMPRESS: NORMAL
BH CV LOWER VASCULAR LEFT MID FEMORAL PHASIC: NORMAL
BH CV LOWER VASCULAR LEFT MID FEMORAL SPONT: NORMAL
BH CV LOWER VASCULAR LEFT PERONEAL COMPRESS: NORMAL
BH CV LOWER VASCULAR LEFT POPLITEAL AUGMENT: NORMAL
BH CV LOWER VASCULAR LEFT POPLITEAL COMPETENT: NORMAL
BH CV LOWER VASCULAR LEFT POPLITEAL COMPRESS: NORMAL
BH CV LOWER VASCULAR LEFT POPLITEAL PHASIC: NORMAL
BH CV LOWER VASCULAR LEFT POPLITEAL SPONT: NORMAL
BH CV LOWER VASCULAR LEFT POSTERIOR TIBIAL COMPRESS: NORMAL
BH CV LOWER VASCULAR LEFT PROXIMAL FEMORAL COMPRESS: NORMAL
BH CV LOWER VASCULAR RIGHT COMMON FEMORAL AUGMENT: NORMAL
BH CV LOWER VASCULAR RIGHT COMMON FEMORAL COMPETENT: NORMAL
BH CV LOWER VASCULAR RIGHT COMMON FEMORAL COMPRESS: NORMAL
BH CV LOWER VASCULAR RIGHT COMMON FEMORAL PHASIC: NORMAL
BH CV LOWER VASCULAR RIGHT COMMON FEMORAL SPONT: NORMAL
BH CV VAS PRELIMINARY FINDINGS SCRIPTING: 1
BILIRUB SERPL-MCNC: 0.3 MG/DL (ref 0–1.2)
BILIRUB UR QL STRIP: NEGATIVE
BUN SERPL-MCNC: 10 MG/DL (ref 6–20)
BUN/CREAT SERPL: 16.4 (ref 7–25)
CALCIUM SPEC-SCNC: 8.9 MG/DL (ref 8.6–10.5)
CHLORIDE SERPL-SCNC: 102 MMOL/L (ref 98–107)
CLARITY UR: ABNORMAL
CO2 SERPL-SCNC: 27.1 MMOL/L (ref 22–29)
COLOR UR: ABNORMAL
CREAT SERPL-MCNC: 0.61 MG/DL (ref 0.57–1)
DEPRECATED RDW RBC AUTO: 51.7 FL (ref 37–54)
EGFRCR SERPLBLD CKD-EPI 2021: 113.2 ML/MIN/1.73
EOSINOPHIL # BLD AUTO: 0.19 10*3/MM3 (ref 0–0.4)
EOSINOPHIL NFR BLD AUTO: 3.5 % (ref 0.3–6.2)
ERYTHROCYTE [DISTWIDTH] IN BLOOD BY AUTOMATED COUNT: 14.8 % (ref 12.3–15.4)
GLOBULIN UR ELPH-MCNC: 3.2 GM/DL
GLUCOSE SERPL-MCNC: 131 MG/DL (ref 65–99)
GLUCOSE UR STRIP-MCNC: NEGATIVE MG/DL
HCG INTACT+B SERPL-ACNC: <0.5 MIU/ML
HCT VFR BLD AUTO: 35.5 % (ref 34–46.6)
HGB BLD-MCNC: 11.4 G/DL (ref 12–15.9)
HGB UR QL STRIP.AUTO: NEGATIVE
HOLD SPECIMEN: NORMAL
HOLD SPECIMEN: NORMAL
HYALINE CASTS UR QL AUTO: ABNORMAL /LPF
IMM GRANULOCYTES # BLD AUTO: 0.02 10*3/MM3 (ref 0–0.05)
IMM GRANULOCYTES NFR BLD AUTO: 0.4 % (ref 0–0.5)
KETONES UR QL STRIP: ABNORMAL
LEUKOCYTE ESTERASE UR QL STRIP.AUTO: ABNORMAL
LIPASE SERPL-CCNC: 11 U/L (ref 13–60)
LYMPHOCYTES # BLD AUTO: 1.5 10*3/MM3 (ref 0.7–3.1)
LYMPHOCYTES NFR BLD AUTO: 27.3 % (ref 19.6–45.3)
MCH RBC QN AUTO: 30.9 PG (ref 26.6–33)
MCHC RBC AUTO-ENTMCNC: 32.1 G/DL (ref 31.5–35.7)
MCV RBC AUTO: 96.2 FL (ref 79–97)
MONOCYTES # BLD AUTO: 0.7 10*3/MM3 (ref 0.1–0.9)
MONOCYTES NFR BLD AUTO: 12.8 % (ref 5–12)
NEUTROPHILS NFR BLD AUTO: 3.06 10*3/MM3 (ref 1.7–7)
NEUTROPHILS NFR BLD AUTO: 55.6 % (ref 42.7–76)
NITRITE UR QL STRIP: NEGATIVE
NRBC BLD AUTO-RTO: 0 /100 WBC (ref 0–0.2)
NT-PROBNP SERPL-MCNC: 119.5 PG/ML (ref 0–450)
PH UR STRIP.AUTO: 6 [PH] (ref 5–8)
PLATELET # BLD AUTO: 242 10*3/MM3 (ref 140–450)
PMV BLD AUTO: 8.8 FL (ref 6–12)
POTASSIUM SERPL-SCNC: 4 MMOL/L (ref 3.5–5.2)
PROT SERPL-MCNC: 6.9 G/DL (ref 6–8.5)
PROT UR QL STRIP: ABNORMAL
RBC # BLD AUTO: 3.69 10*6/MM3 (ref 3.77–5.28)
RBC # UR STRIP: ABNORMAL /HPF
REF LAB TEST METHOD: ABNORMAL
SODIUM SERPL-SCNC: 140 MMOL/L (ref 136–145)
SP GR UR STRIP: 1.03 (ref 1–1.03)
SQUAMOUS #/AREA URNS HPF: ABNORMAL /HPF
TROPONIN T SERPL HS-MCNC: <6 NG/L
UROBILINOGEN UR QL STRIP: ABNORMAL
WBC # UR STRIP: ABNORMAL /HPF
WBC NRBC COR # BLD: 5.49 10*3/MM3 (ref 3.4–10.8)
WHOLE BLOOD HOLD COAG: NORMAL
WHOLE BLOOD HOLD SPECIMEN: NORMAL

## 2023-06-06 PROCEDURE — 84484 ASSAY OF TROPONIN QUANT: CPT

## 2023-06-06 PROCEDURE — 36415 COLL VENOUS BLD VENIPUNCTURE: CPT

## 2023-06-06 PROCEDURE — 83690 ASSAY OF LIPASE: CPT

## 2023-06-06 PROCEDURE — 99204 OFFICE O/P NEW MOD 45 MIN: CPT | Performed by: FAMILY MEDICINE

## 2023-06-06 PROCEDURE — 43247 EGD REMOVE FOREIGN BODY: CPT | Performed by: INTERNAL MEDICINE

## 2023-06-06 PROCEDURE — G0378 HOSPITAL OBSERVATION PER HR: HCPCS

## 2023-06-06 PROCEDURE — 74176 CT ABD & PELVIS W/O CONTRAST: CPT

## 2023-06-06 PROCEDURE — 25010000002 PROPOFOL 10 MG/ML EMULSION

## 2023-06-06 PROCEDURE — 25010000002 DEXAMETHASONE PER 1 MG

## 2023-06-06 PROCEDURE — 93971 EXTREMITY STUDY: CPT | Performed by: SURGERY

## 2023-06-06 PROCEDURE — 25010000002 MIDAZOLAM PER 1MG

## 2023-06-06 PROCEDURE — 81001 URINALYSIS AUTO W/SCOPE: CPT

## 2023-06-06 PROCEDURE — 74022 RADEX COMPL AQT ABD SERIES: CPT

## 2023-06-06 PROCEDURE — 99284 EMERGENCY DEPT VISIT MOD MDM: CPT

## 2023-06-06 PROCEDURE — 93971 EXTREMITY STUDY: CPT

## 2023-06-06 PROCEDURE — 80053 COMPREHEN METABOLIC PANEL: CPT

## 2023-06-06 PROCEDURE — 84702 CHORIONIC GONADOTROPIN TEST: CPT | Performed by: NURSE PRACTITIONER

## 2023-06-06 PROCEDURE — 83880 ASSAY OF NATRIURETIC PEPTIDE: CPT | Performed by: NURSE PRACTITIONER

## 2023-06-06 PROCEDURE — 81025 URINE PREGNANCY TEST: CPT | Performed by: INTERNAL MEDICINE

## 2023-06-06 PROCEDURE — 25010000002 ONDANSETRON PER 1 MG

## 2023-06-06 PROCEDURE — 85025 COMPLETE CBC W/AUTO DIFF WBC: CPT

## 2023-06-06 RX ORDER — IBUPROFEN 400 MG/1
400 TABLET ORAL EVERY 8 HOURS PRN
COMMUNITY

## 2023-06-06 RX ORDER — PHENYLEPHRINE HCL IN 0.9% NACL 1 MG/10 ML
SYRINGE (ML) INTRAVENOUS AS NEEDED
Status: DISCONTINUED | OUTPATIENT
Start: 2023-06-06 | End: 2023-06-07 | Stop reason: SURG

## 2023-06-06 RX ORDER — ROCURONIUM BROMIDE 10 MG/ML
INJECTION, SOLUTION INTRAVENOUS AS NEEDED
Status: DISCONTINUED | OUTPATIENT
Start: 2023-06-06 | End: 2023-06-07 | Stop reason: SURG

## 2023-06-06 RX ORDER — SODIUM CHLORIDE 0.9 % (FLUSH) 0.9 %
10 SYRINGE (ML) INJECTION AS NEEDED
Status: DISCONTINUED | OUTPATIENT
Start: 2023-06-06 | End: 2023-06-07

## 2023-06-06 RX ORDER — VENLAFAXINE HYDROCHLORIDE 37.5 MG/1
37.5 CAPSULE, EXTENDED RELEASE ORAL DAILY
COMMUNITY
End: 2023-06-08 | Stop reason: HOSPADM

## 2023-06-06 RX ORDER — DEXAMETHASONE SODIUM PHOSPHATE 4 MG/ML
INJECTION, SOLUTION INTRA-ARTICULAR; INTRALESIONAL; INTRAMUSCULAR; INTRAVENOUS; SOFT TISSUE AS NEEDED
Status: DISCONTINUED | OUTPATIENT
Start: 2023-06-06 | End: 2023-06-07 | Stop reason: SURG

## 2023-06-06 RX ORDER — POTASSIUM CHLORIDE 20 MEQ/1
20 TABLET, EXTENDED RELEASE ORAL DAILY
COMMUNITY
Start: 2023-06-05 | End: 2023-06-08 | Stop reason: HOSPADM

## 2023-06-06 RX ORDER — SODIUM CHLORIDE, SODIUM LACTATE, POTASSIUM CHLORIDE, CALCIUM CHLORIDE 600; 310; 30; 20 MG/100ML; MG/100ML; MG/100ML; MG/100ML
INJECTION, SOLUTION INTRAVENOUS CONTINUOUS PRN
Status: DISCONTINUED | OUTPATIENT
Start: 2023-06-06 | End: 2023-06-07 | Stop reason: SURG

## 2023-06-06 RX ORDER — PRAZOSIN HYDROCHLORIDE 1 MG/1
1 CAPSULE ORAL NIGHTLY
COMMUNITY

## 2023-06-06 RX ORDER — SUCCINYLCHOLINE/SOD CL,ISO/PF 100 MG/5ML
SYRINGE (ML) INTRAVENOUS AS NEEDED
Status: DISCONTINUED | OUTPATIENT
Start: 2023-06-06 | End: 2023-06-07 | Stop reason: SURG

## 2023-06-06 RX ORDER — FUROSEMIDE 40 MG/1
40 TABLET ORAL DAILY
COMMUNITY
Start: 2023-06-05 | End: 2023-06-08 | Stop reason: HOSPADM

## 2023-06-06 RX ORDER — LIDOCAINE HYDROCHLORIDE 20 MG/ML
INJECTION, SOLUTION EPIDURAL; INFILTRATION; INTRACAUDAL; PERINEURAL AS NEEDED
Status: DISCONTINUED | OUTPATIENT
Start: 2023-06-06 | End: 2023-06-07 | Stop reason: SURG

## 2023-06-06 RX ORDER — ONDANSETRON 2 MG/ML
INJECTION INTRAMUSCULAR; INTRAVENOUS AS NEEDED
Status: DISCONTINUED | OUTPATIENT
Start: 2023-06-06 | End: 2023-06-07 | Stop reason: SURG

## 2023-06-06 RX ORDER — PROPOFOL 10 MG/ML
VIAL (ML) INTRAVENOUS AS NEEDED
Status: DISCONTINUED | OUTPATIENT
Start: 2023-06-06 | End: 2023-06-07 | Stop reason: SURG

## 2023-06-06 RX ORDER — MIDAZOLAM HYDROCHLORIDE 2 MG/2ML
INJECTION, SOLUTION INTRAMUSCULAR; INTRAVENOUS AS NEEDED
Status: DISCONTINUED | OUTPATIENT
Start: 2023-06-06 | End: 2023-06-07 | Stop reason: SURG

## 2023-06-06 RX ORDER — MULTIPLE VITAMINS W/ MINERALS TAB 9MG-400MCG
1 TAB ORAL DAILY
COMMUNITY

## 2023-06-06 RX ADMIN — ROCURONIUM BROMIDE 45 MG: 10 INJECTION, SOLUTION INTRAVENOUS at 23:44

## 2023-06-06 RX ADMIN — MIDAZOLAM HYDROCHLORIDE 2 MG: 1 INJECTION, SOLUTION INTRAMUSCULAR; INTRAVENOUS at 23:30

## 2023-06-06 RX ADMIN — DEXAMETHASONE SODIUM PHOSPHATE 4 MG: 4 INJECTION, SOLUTION INTRA-ARTICULAR; INTRALESIONAL; INTRAMUSCULAR; INTRAVENOUS; SOFT TISSUE at 23:44

## 2023-06-06 RX ADMIN — ONDANSETRON 4 MG: 2 INJECTION INTRAMUSCULAR; INTRAVENOUS at 23:44

## 2023-06-06 RX ADMIN — Medication 100 MCG: at 23:53

## 2023-06-06 RX ADMIN — ROCURONIUM BROMIDE 10 MG: 10 INJECTION, SOLUTION INTRAVENOUS at 23:52

## 2023-06-06 RX ADMIN — Medication 100 MG: at 23:39

## 2023-06-06 RX ADMIN — SODIUM CHLORIDE, POTASSIUM CHLORIDE, SODIUM LACTATE AND CALCIUM CHLORIDE: 600; 310; 30; 20 INJECTION, SOLUTION INTRAVENOUS at 23:39

## 2023-06-06 RX ADMIN — PROPOFOL 200 MG: 10 INJECTION, EMULSION INTRAVENOUS at 23:39

## 2023-06-06 RX ADMIN — ROCURONIUM BROMIDE 5 MG: 10 INJECTION, SOLUTION INTRAVENOUS at 23:39

## 2023-06-06 RX ADMIN — LIDOCAINE HYDROCHLORIDE 50 MG: 20 INJECTION, SOLUTION EPIDURAL; INFILTRATION; INTRACAUDAL; PERINEURAL at 23:39

## 2023-06-06 NOTE — ED PROVIDER NOTES
Time: 4:38 PM EDT  Date of encounter:  6/6/2023  Independent Historian/Clinical History and Information was obtained by:   Patient  Chief Complaint: Leg pain     History is limited by: N/A    History of Present Illness:  Patient is a 43 y.o. year old female who presents to the emergency department for evaluation of left leg pain x2 weeks.  Patient states she was previously seen emergency department few days ago for bilateral leg swelling.  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  Patient states that per her report she was given Lasix and recently upped her dose to 40 mg.  Patient states that her legs still feel swollen however her left leg has now had increased pain.  Patient denies any change of color or temperature to the left leg.  Patient states the pain gets worse with walking.  Patient says that she has been using a wheelchair recovery works.  Patient denies shortness of breath, chest pain.  Patient denies previous DVTs or PEs.  Patient is also complaining of abdominal pain x2 days.  Patient states her abdominal pain is located in the epigastric region gets worse with eating.  Patient states that she has pica and has ingested 3-5 ink pens.  Patient denies cough, vomiting, diarrhea, fever.  Patient admits to nausea, constipation, and mild chills.  Patient denies passing any remnants of the ink pens.    HPI    Patient Care Team  Primary Care Provider: Provider, No Known    Past Medical History:     Allergies   Allergen Reactions    Tylenol [Acetaminophen] Anaphylaxis    Morphine Other (See Comments)     STATES IT MAKES HER CRAZY    Penicillins Unknown - Low Severity     STATES MOM TOLD HER AS CHILD     Past Medical History:   Diagnosis Date    Cancer     OVARIAN AND BLADDER     Past Surgical History:   Procedure Laterality Date    BREAST SURGERY      HYSTERECTOMY       History reviewed. No pertinent family  "history.    Home Medications:  Prior to Admission medications    Medication Sig Start Date End Date Taking? Authorizing Provider   buprenorphine-naloxone (SUBOXONE) 8-2 MG per SL tablet Place 1 tablet under the tongue Daily.    ProviderIrish MD   famotidine (PEPCID) 20 MG tablet Take 1 tablet by mouth 2 (Two) Times a Day. 5/14/20   You Mckeon MD   furosemide (LASIX) 20 MG tablet Take 1 tablet by mouth Daily. 6/1/23   Lucho Gusman DO   melatonin 5 MG sublingual tablet sublingual tablet Place  under the tongue.    ProviderIrish MD   ondansetron ODT (ZOFRAN-ODT) 8 MG disintegrating tablet Place 1 tablet on the tongue Every 8 (Eight) Hours As Needed for Nausea or Vomiting. 5/14/20   You Mckeon MD   potassium chloride 10 MEQ CR tablet Take 1 tablet by mouth Daily. 6/1/23   Lucho Gusman DO   promethazine (PHENERGAN) 25 MG suppository Insert 1 suppository into the rectum Every 4 (Four) Hours As Needed for Nausea or Vomiting. 5/14/20   You Mckeon MD   traZODone (DESYREL) 50 MG tablet Take 1 tablet by mouth Every Night.    ProviderIrish MD        Social History:   Social History     Tobacco Use    Smoking status: Every Day     Packs/day: 1.00     Years: 25.00     Pack years: 25.00     Types: Cigarettes   Substance Use Topics    Alcohol use: Not Currently     Comment: Soder since 5-19-23    Drug use: Not Currently         Review of Systems:  Review of Systems   Constitutional:  Positive for chills. Negative for fever.   Respiratory:  Negative for cough and shortness of breath.    Cardiovascular:  Positive for leg swelling. Negative for chest pain.   Gastrointestinal:  Positive for abdominal pain, constipation and nausea. Negative for diarrhea and vomiting.      Physical Exam:  /73   Pulse 81   Temp 98.4 °F (36.9 °C) (Oral)   Resp 20   Ht 167.6 cm (66\")   Wt 97.3 kg (214 lb 8.1 oz)   SpO2 92%   BMI 34.62 kg/m²     Physical Exam  Vitals and nursing note reviewed. "   Constitutional:       General: She is not in acute distress.     Appearance: Normal appearance. She is obese. She is not ill-appearing, toxic-appearing or diaphoretic.   HENT:      Head: Normocephalic and atraumatic.      Nose: Nose normal.      Mouth/Throat:      Mouth: Mucous membranes are moist.   Eyes:      Extraocular Movements: Extraocular movements intact.      Pupils: Pupils are equal, round, and reactive to light.   Cardiovascular:      Rate and Rhythm: Normal rate and regular rhythm.      Pulses: Normal pulses.           Dorsalis pedis pulses are 2+ on the right side and 2+ on the left side.      Heart sounds: Normal heart sounds.   Pulmonary:      Effort: Pulmonary effort is normal. No respiratory distress.      Breath sounds: Normal breath sounds. No wheezing.   Chest:      Chest wall: No tenderness.   Abdominal:      General: Abdomen is flat. Bowel sounds are normal. There is no distension or abdominal bruit.      Palpations: Abdomen is soft. There is no pulsatile mass.      Tenderness: There is abdominal tenderness (Mild) in the epigastric area and left upper quadrant. There is no right CVA tenderness, left CVA tenderness, guarding or rebound. Negative signs include Baez's sign, Rovsing's sign, McBurney's sign and psoas sign.   Musculoskeletal:         General: Normal range of motion.      Cervical back: Normal range of motion and neck supple.      Left knee: No swelling. Normal range of motion. No tenderness.      Right lower le+ Pitting No edema.      Left lower leg: Tenderness present. No swelling. 1+ Pitting No edema.      Left foot: Normal range of motion and normal capillary refill. Normal pulse.   Skin:     General: Skin is warm and dry.             Comments: Patient has a significant old scar from previous abdominal surgery, patient states this is from when she had abdominal surgery from ingesting foreign objects.   Neurological:      General: No focal deficit present.      Mental  Status: She is alert and oriented to person, place, and time.   Psychiatric:         Mood and Affect: Mood normal.         Behavior: Behavior normal.                Procedures:  Procedures      Medical Decision Making:      Comorbidities that affect care:    Substance Abuse    External Notes reviewed:    Previous ED Note: Patient was seen emergency department on 6/1/2023 for pedal edema.      The following orders were placed and all results were independently analyzed by me:  Orders Placed This Encounter   Procedures    XR Abdomen 2+ VW with Chest 1 VW    CT Abdomen Pelvis Without Contrast    Darwin Draw    Comprehensive Metabolic Panel    Lipase    Single High Sensitivity Troponin T    Urinalysis With Microscopic If Indicated (No Culture) - Urine, Clean Catch    CBC Auto Differential    BNP    hCG, Quantitative, Pregnancy    Urinalysis, Microscopic Only - Urine, Clean Catch    NPO Diet NPO Type: Strict NPO    Undress & Gown    Continuous Pulse Oximetry    Vital Signs    Obtain Informed Consent    Code Status and Medical Interventions:    General MD Inpatient Consult    Hospitalist (on-call MD unless specified)    Oxygen Therapy- Nasal Cannula; Titrate 1-6 LPM Per SpO2; 90 - 95%    ECG 12 Lead ED Triage Standing Order; Abdominal Pain (Upper)    Insert Peripheral IV    Initiate Observation Status    CBC & Differential    Green Top (Gel)    Lavender Top    Gold Top - SST    Light Blue Top       Medications Given in the Emergency Department:  Medications   sodium chloride 0.9 % flush 10 mL (has no administration in time range)        ED Course:    ED Course as of 06/06/23 2255 Tue Jun 06, 2023   1708 Duplex Venous Lower Extremity - Left CAR [YS]   1710 Alkaline Phosphatase(!): 185 [YS]   2252 Dr. Arthur initially spoke with Dr. Lau, who states that he will do a note possibly tomorrow morning to remove foreign bodies from patient's stomach.  Would like hospitalist team to be involved.  Spoke with Dr. Soria, who is  agreeable to accept patient under his service. [YS]   2253 Patient states her last meal was at 6 AM on on 6/6/2023.  Denies being on blood thinners.'s denies having a primary care provider. [YS]      ED Course User Index  [YS] Shabbir, Yusra, PA       Labs:    Lab Results (last 24 hours)       Procedure Component Value Units Date/Time    CBC & Differential [[id number]]  (Abnormal) Collected: 06/06/23 1425    Specimen: Blood Updated: 06/06/23 1450    Narrative:      The following orders were created for panel order CBC & Differential.  Procedure                               Abnormality         Status                     ---------                               -----------         ------                     CBC Auto Differential[[id number]]        Abnormal            Final result                 Please view results for these tests on the individual orders.    Comprehensive Metabolic Panel [[id number]]  (Abnormal) Collected: 06/06/23 1425    Specimen: Blood Updated: 06/06/23 1520     Glucose 131 mg/dL      BUN 10 mg/dL      Creatinine 0.61 mg/dL      Sodium 140 mmol/L      Potassium 4.0 mmol/L      Chloride 102 mmol/L      CO2 27.1 mmol/L      Calcium 8.9 mg/dL      Total Protein 6.9 g/dL      Albumin 3.7 g/dL      ALT (SGPT) 12 U/L      AST (SGOT) 17 U/L      Alkaline Phosphatase 185 U/L      Total Bilirubin 0.3 mg/dL      Globulin 3.2 gm/dL      A/G Ratio 1.2 g/dL      BUN/Creatinine Ratio 16.4     Anion Gap 10.9 mmol/L      eGFR 113.2 mL/min/1.73     Narrative:      GFR Normal >60  Chronic Kidney Disease <60  Kidney Failure <15      Lipase [[id number]]  (Abnormal) Collected: 06/06/23 1425    Specimen: Blood Updated: 06/06/23 1515     Lipase 11 U/L     Single High Sensitivity Troponin T [[id number]]  (Normal) Collected: 06/06/23 1425    Specimen: Blood Updated: 06/06/23 1515     HS Troponin T <6 ng/L     Narrative:      High Sensitive Troponin T Reference Range:  <10.0 ng/L- Negative Female for AMI  <15.0 ng/L- Negative  Male for AMI  >=10 - Abnormal Female indicating possible myocardial injury.  >=15 - Abnormal Male indicating possible myocardial injury.   Clinicians would have to utilize clinical acumen, EKG, Troponin, and serial changes to determine if it is an Acute Myocardial Infarction or myocardial injury due to an underlying chronic condition.         CBC Auto Differential [[id number]]  (Abnormal) Collected: 06/06/23 1425    Specimen: Blood Updated: 06/06/23 1450     WBC 5.49 10*3/mm3      RBC 3.69 10*6/mm3      Hemoglobin 11.4 g/dL      Hematocrit 35.5 %      MCV 96.2 fL      MCH 30.9 pg      MCHC 32.1 g/dL      RDW 14.8 %      RDW-SD 51.7 fl      MPV 8.8 fL      Platelets 242 10*3/mm3      Neutrophil % 55.6 %      Lymphocyte % 27.3 %      Monocyte % 12.8 %      Eosinophil % 3.5 %      Basophil % 0.4 %      Immature Grans % 0.4 %      Neutrophils, Absolute 3.06 10*3/mm3      Lymphocytes, Absolute 1.50 10*3/mm3      Monocytes, Absolute 0.70 10*3/mm3      Eosinophils, Absolute 0.19 10*3/mm3      Basophils, Absolute 0.02 10*3/mm3      Immature Grans, Absolute 0.02 10*3/mm3      nRBC 0.0 /100 WBC     BNP [[id number]]  (Normal) Collected: 06/06/23 1425    Specimen: Blood Updated: 06/06/23 1544     proBNP 119.5 pg/mL     Narrative:      Among patients with dyspnea, NT-proBNP is highly sensitive for the detection of acute congestive heart failure. In addition NT-proBNP of <300 pg/ml effectively rules out acute congestive heart failure with 99% negative predictive value.    Results may be falsely decreased if patient taking Biotin.      hCG, Quantitative, Pregnancy [[id number]] Collected: 06/06/23 1425    Specimen: Blood Updated: 06/06/23 1549     HCG Quantitative <0.50 mIU/mL     Narrative:      HCG Ranges by Gestational Age    Females - non-pregnant premenopausal   </= 1mIU/mL HCG  Females - postmenopausal               </= 7mIU/mL HCG    3 Weeks       5.4   -      72 mIU/mL  4 Weeks      10.2   -     708 mIU/mL  5 Weeks       217    "-   8,245 mIU/mL  6 Weeks       152   -  32,177 mIU/mL  7 Weeks     4,059   - 153,767 mIU/mL  8 Weeks    31,366   - 149,094 mIU/mL  9 Weeks    59,109   - 135,901 mIU/mL  10 Weeks   44,186   - 170,409 mIU/mL  12 Weeks   27,107   - 201,615 mIU/mL  14 Weeks   24,302   -  93,646 mIU/mL  15 Weeks   12,540   -  69,747 mIU/mL  16 Weeks    8,904   -  55,332 mIU/mL  17 Weeks    8,240   -  51,793 mIU/mL  18 Weeks    9,649   -  55,271 mIU/mL      Urinalysis With Microscopic If Indicated (No Culture) - Urine, Clean Catch [[id number]]  (Abnormal) Collected: 06/06/23 1526    Specimen: Urine, Clean Catch Updated: 06/06/23 1606     Color, UA Dark Yellow     Appearance, UA Cloudy     pH, UA 6.0     Specific Gravity, UA 1.027     Glucose, UA Negative     Ketones, UA Trace     Bilirubin, UA Negative     Blood, UA Negative     Protein, UA Trace     Leuk Esterase, UA Trace     Nitrite, UA Negative     Urobilinogen, UA 1.0 E.U./dL    Urinalysis, Microscopic Only - Urine, Clean Catch [[id number]]  (Abnormal) Collected: 06/06/23 1526    Specimen: Urine, Clean Catch Updated: 06/06/23 1616     RBC, UA None Seen /HPF      WBC, UA 3-5 /HPF      Bacteria, UA Trace /HPF      Squamous Epithelial Cells, UA 3-6 /HPF      Hyaline Casts, UA None Seen /LPF      Methodology Manual Light Microscopy             Imaging:    CT Abdomen Pelvis Without Contrast    Result Date: 6/6/2023  PROCEDURE: CT ABDOMEN PELVIS WO CONTRAST  COMPARISON: [facility], CR, XR ABDOMEN 2+ VIEWS W CHEST 1 VW, 6/06/2023, 16:25.  INDICATIONS: foreign body ingestion / 3-5 ink pens 3-4 days ago, states \"they haven't passed\". LUQ pain.  TECHNIQUE: CT images were created without intravenous contrast.   PROTOCOL:   Standard imaging protocol performed    RADIATION:   DLP: 815.3mGy*cm   Automated exposure control was utilized to minimize radiation dose.  FINDINGS:  The lung bases are clear.  Bilateral breast implants are noted.  The unenhanced liver, adrenal glands, kidneys, " spleen, and pancreas are unremarkable.  The gallbladder is surgically absent.  There is pneumobilia, likely secondary to prior sphincterotomy.  Four radiopaque foreign bodies are present within the stomach.  The small bowel appears normal in caliber and configuration.  The colon appears normal.  The appendix is not visualized.  There is no ascites or loculated collection.  No abnormally enlarged lymph nodes are identified.  There is laxity along the anterior abdominal wall.  The rectum and urinary bladder are unremarkable.  The uterus is surgically absent.  No aggressive osseous lesions are identified.        1. Four radiopaque foreign bodies within stomach. 2. Nonobstructive bowel gas pattern.     STEPH LINARES MD       Electronically Signed and Approved By: STEPH LINARES MD on 6/06/2023 at 21:38             XR Abdomen 2+ VW with Chest 1 VW    Addendum Date: 6/6/2023    ADDENDUM:  The initial radiographs were interpreted as a chest radiograph.  The views of the abdomen were not reviewed.  There are 4 small metallic densities projected over the left upper quadrant which may be within the bowel.  There is a moderate amount of fecal residue in the colon.  No small bowel dilatation is present.    Sukhdev Luciano MD       Electronically Signed and Approved By: Sukhdev Luciano MD on 6/06/2023 at 18:10             Result Date: 6/6/2023  PROCEDURE: XR ABDOMEN 2+ VIEWS W CHEST 1 VW  COMPARISON: [facility], CR, ABDOMEN SINGLE AP, 9/14/2020, 7:42.  INDICATIONS: SWALLOWED FOREIGN OBJECT  FINDINGS:  The heart size is normal and the lungs are clear.  There are bilateral breast prostheses in place.  No radiopaque foreign bodies seen.        1. No active disease 2. No significant radiopaque foreign bodies are identified     Sukhdev Luciano MD       Electronically Signed and Approved By: Sukhdev Luciano MD on 6/06/2023 at 16:56             Duplex Venous Lower Extremity - Left CAR    Result Date: 6/6/2023    Normal  left lower extremity venous duplex scan.        Differential Diagnosis and Discussion:    Abdominal Pain: Based on the patient's signs and symptoms, I considered abdominal aortic aneurysm, small bowel obstruction, pancreatitis, acute cholecystitis, acute appendecitis, peptic ulcer disease, gastritis, colitis, endocrine disorders, irritable bowel syndrome and other differential diagnosis an etiology of the patient's abdominal pain.  Extremity Pain: Differential diagnosis includes but is not limited to soft tissue sprain, tendonitis, tendon injury, dislocation, fracture, deep vein thrombosis, arterial insufficiency, osteoarthritis, bursitis, and ligamentous damage.    All X-rays impressions were independently interpreted by me.  Ultrasound impression was interpreted by me.     MDM  Number of Diagnoses or Management Options  Diagnosis management comments: Patient reports emergency department complaining of left leg pain x1 week.  Patient states that she was recently seen emergency department for bilateral leg swelling and placed on Lasix at recovery work for the swelling.  Patient is also complaining of abdominal pain due to ingestion of multiple in pens.  On physical exam patient is tender in the epigastric region.  Patient has a significant vertical scar on her abdomen from previous abdominal surgeries, patient states this is from when she previously ingested foreign objects.  On physical exam patient had left calf tenderness.  Patient has mild, 1+ pitting edema in bilateral ankles and lower extremities.  Patient's urinalysis unremarkable.  Patient's pregnancy negative.  Patient's BNP within normal limits.  Patient's troponin negative.  Patient's lipase unremarkable.  Patient CBC unremarkable within normal WBC and hemoglobin.  Patient's CMP remarkable for alk phos elevated at 185.  Lab work from 6/01 had alk phos elevated at 171.  Patient's vital signs within normal limits.  Duplex of left lower extremity negative  for DVT.  Initially, x-ray of abdomen and chest negative for any acute findings.  However the x-ray was later addended, revisions acknowledged 4 radiopaque objects on x-ray of abdomen.  CT of abdomen and pelvis consistent with the findings of 4 foreign objects within the stomach.  Dr. Radford discussed patient with Dr. Lau, who states that patient should be stable to have endoscopy performed tomorrow morning.  Requested patient to be discussed with hospitalist team.  Spoke with Dr. Soria regarding patient, agreeable to accept patient under his service.       Amount and/or Complexity of Data Reviewed  Decide to obtain previous medical records or to obtain history from someone other than the patient: yes             Patient Care Considerations:          Consultants/Shared Management Plan:    Hospitalist: I have discussed the case with  who agrees to accept the patient for admission.  Consultant: I have discussed the case with nusrat Elliott who agrees to consult on the patient.    Social Determinants of Health:    Patient is independent, reliable, and has access to care.       Disposition and Care Coordination:    Admit:   Through independent evaluation of the patient's history, physical, and imperical data, the patient meets criteria for observation/admission to the hospital.        Final diagnoses:   Foreign body in stomach        ED Disposition       ED Disposition   Decision to Admit    Condition   --    Comment   Level of Care: Med/Surg [1]   Diagnosis: Foreign body in stomach [935.2.ICD-9-CM]   Admitting Physician: RONAK SORIA [[id number]]   Attending Physician: RONAK SORIA [[id number]]                 This medical record created using voice recognition software.             Shabbir, Yusra, PA  06/06/23 1236

## 2023-06-06 NOTE — ED PROVIDER NOTES
Patient is 43 y.o. year old female that presents to the ED for evaluation of leg swelling and abdominal pain.     Physical Exam  Vitals and nursing note reviewed.   Constitutional:       General: She is not in acute distress.     Appearance: Normal appearance. She is not ill-appearing, toxic-appearing or diaphoretic.   HENT:      Head: Normocephalic and atraumatic.      Mouth/Throat:      Mouth: Mucous membranes are moist.   Eyes:      Pupils: Pupils are equal, round, and reactive to light.   Cardiovascular:      Rate and Rhythm: Normal rate and regular rhythm.      Pulses: Normal pulses.           Carotid pulses are 2+ on the right side and 2+ on the left side.       Radial pulses are 2+ on the right side and 2+ on the left side.        Femoral pulses are 2+ on the right side and 2+ on the left side.       Popliteal pulses are 2+ on the right side and 2+ on the left side.        Dorsalis pedis pulses are 2+ on the right side and 2+ on the left side.        Posterior tibial pulses are 2+ on the right side and 2+ on the left side.      Heart sounds: Normal heart sounds. No murmur heard.     Comments: The patient has very mild edema in the legs bilaterally  Pulmonary:      Effort: Pulmonary effort is normal. No accessory muscle usage, respiratory distress or retractions.      Breath sounds: Normal breath sounds. No wheezing, rhonchi or rales.   Abdominal:      General: Abdomen is flat. A surgical scar is present. There is no distension.      Palpations: Abdomen is soft. There is no mass or pulsatile mass.      Tenderness: There is no abdominal tenderness in the left upper quadrant. There is no right CVA tenderness, left CVA tenderness, guarding or rebound.      Comments: No rigidity   Musculoskeletal:         General: No swelling, tenderness or deformity.      Cervical back: Neck supple. No tenderness.      Right lower leg: Edema present.      Left lower leg: Edema present.   Skin:     General: Skin is warm and dry.  "     Capillary Refill: Capillary refill takes less than 2 seconds.      Coloration: Skin is not jaundiced or pale.      Findings: No erythema.   Neurological:      General: No focal deficit present.      Mental Status: She is alert and oriented to person, place, and time. Mental status is at baseline.      Cranial Nerves: Cranial nerves 2-12 are intact. No cranial nerve deficit.      Sensory: Sensation is intact. No sensory deficit.      Motor: Motor function is intact. No weakness or pronator drift.      Coordination: Coordination is intact. Coordination normal.   Psychiatric:         Mood and Affect: Mood normal.         Behavior: Behavior normal.       ED Course:    /73   Pulse 81   Temp 98.4 °F (36.9 °C) (Oral)   Resp 20   Ht 167.6 cm (66\")   Wt 97.3 kg (214 lb 8.1 oz)   SpO2 92%   BMI 34.62 kg/m²   Results for orders placed or performed during the hospital encounter of 06/06/23   Comprehensive Metabolic Panel    Specimen: Blood   Result Value Ref Range    Glucose 131 (H) 65 - 99 mg/dL    BUN 10 6 - 20 mg/dL    Creatinine 0.61 0.57 - 1.00 mg/dL    Sodium 140 136 - 145 mmol/L    Potassium 4.0 3.5 - 5.2 mmol/L    Chloride 102 98 - 107 mmol/L    CO2 27.1 22.0 - 29.0 mmol/L    Calcium 8.9 8.6 - 10.5 mg/dL    Total Protein 6.9 6.0 - 8.5 g/dL    Albumin 3.7 3.5 - 5.2 g/dL    ALT (SGPT) 12 1 - 33 U/L    AST (SGOT) 17 1 - 32 U/L    Alkaline Phosphatase 185 (H) 39 - 117 U/L    Total Bilirubin 0.3 0.0 - 1.2 mg/dL    Globulin 3.2 gm/dL    A/G Ratio 1.2 g/dL    BUN/Creatinine Ratio 16.4 7.0 - 25.0    Anion Gap 10.9 5.0 - 15.0 mmol/L    eGFR 113.2 >60.0 mL/min/1.73   Lipase    Specimen: Blood   Result Value Ref Range    Lipase 11 (L) 13 - 60 U/L   Single High Sensitivity Troponin T    Specimen: Blood   Result Value Ref Range    HS Troponin T <6 <10 ng/L   Urinalysis With Microscopic If Indicated (No Culture) - Urine, Clean Catch    Specimen: Urine, Clean Catch   Result Value Ref Range    Color, UA Dark Yellow " (A) Yellow, Straw    Appearance, UA Cloudy (A) Clear    pH, UA 6.0 5.0 - 8.0    Specific Gravity, UA 1.027 1.005 - 1.030    Glucose, UA Negative Negative    Ketones, UA Trace (A) Negative    Bilirubin, UA Negative Negative    Blood, UA Negative Negative    Protein, UA Trace (A) Negative    Leuk Esterase, UA Trace (A) Negative    Nitrite, UA Negative Negative    Urobilinogen, UA 1.0 E.U./dL 0.2 - 1.0 E.U./dL   CBC Auto Differential    Specimen: Blood   Result Value Ref Range    WBC 5.49 3.40 - 10.80 10*3/mm3    RBC 3.69 (L) 3.77 - 5.28 10*6/mm3    Hemoglobin 11.4 (L) 12.0 - 15.9 g/dL    Hematocrit 35.5 34.0 - 46.6 %    MCV 96.2 79.0 - 97.0 fL    MCH 30.9 26.6 - 33.0 pg    MCHC 32.1 31.5 - 35.7 g/dL    RDW 14.8 12.3 - 15.4 %    RDW-SD 51.7 37.0 - 54.0 fl    MPV 8.8 6.0 - 12.0 fL    Platelets 242 140 - 450 10*3/mm3    Neutrophil % 55.6 42.7 - 76.0 %    Lymphocyte % 27.3 19.6 - 45.3 %    Monocyte % 12.8 (H) 5.0 - 12.0 %    Eosinophil % 3.5 0.3 - 6.2 %    Basophil % 0.4 0.0 - 1.5 %    Immature Grans % 0.4 0.0 - 0.5 %    Neutrophils, Absolute 3.06 1.70 - 7.00 10*3/mm3    Lymphocytes, Absolute 1.50 0.70 - 3.10 10*3/mm3    Monocytes, Absolute 0.70 0.10 - 0.90 10*3/mm3    Eosinophils, Absolute 0.19 0.00 - 0.40 10*3/mm3    Basophils, Absolute 0.02 0.00 - 0.20 10*3/mm3    Immature Grans, Absolute 0.02 0.00 - 0.05 10*3/mm3    nRBC 0.0 0.0 - 0.2 /100 WBC   BNP    Specimen: Blood   Result Value Ref Range    proBNP 119.5 0.0 - 450.0 pg/mL   hCG, Quantitative, Pregnancy    Specimen: Blood   Result Value Ref Range    HCG Quantitative <0.50 mIU/mL   Urinalysis, Microscopic Only - Urine, Clean Catch    Specimen: Urine, Clean Catch   Result Value Ref Range    RBC, UA None Seen None Seen /HPF    WBC, UA 3-5 (A) None Seen /HPF    Bacteria, UA Trace (A) None Seen /HPF    Squamous Epithelial Cells, UA 3-6 (A) None Seen, 0-2 /HPF    Hyaline Casts, UA None Seen None Seen /LPF    Methodology Manual Light Microscopy    Green Top (Gel)   Result  "Value Ref Range    Extra Tube Hold for add-ons.    Lavender Top   Result Value Ref Range    Extra Tube hold for add-on    Gold Top - SST   Result Value Ref Range    Extra Tube Hold for add-ons.    Light Blue Top   Result Value Ref Range    Extra Tube Hold for add-ons.    Duplex Venous Lower Extremity - Left CAR   Result Value Ref Range    Right Common Femoral Spont Y     Right Common Femoral Competent Y     Right Common Femoral Phasic Y     Right Common Femoral Compress C     Right Common Femoral Augment Y     Left Common Femoral Spont Y     Left Common Femoral Competent Y     Left Common Femoral Phasic Y     Left Common Femoral Compress C     Left Common Femoral Augment Y     Left Proximal Femoral Compress C     Left Mid Femoral Spont Y     Left Mid Femoral Competent Y     Left Mid Femoral Phasic Y     Left Mid Femoral Compress C     Left Mid Femoral Augment Y     Left Distal Femoral Compress C     Left Popliteal Spont Y     Left Popliteal Competent Y     Left Popliteal Phasic Y     Left Popliteal Compress C     Left Popliteal Augment Y     Left Posterior Tibial Compress C     Left Peroneal Compress C     Left Gastronemius Compress C     Left Greater Saph AK Compress C     Left Greater Saph BK Compress C     Left Lesser Saph Compress C     BH CV VAS PRELIMINARY FINDINGS SCRIPTING 1.0      Medications   sodium chloride 0.9 % flush 10 mL (has no administration in time range)     CT Abdomen Pelvis Without Contrast    Result Date: 6/6/2023  Narrative: PROCEDURE: CT ABDOMEN PELVIS WO CONTRAST  COMPARISON: [facility], CR, XR ABDOMEN 2+ VIEWS W CHEST 1 VW, 6/06/2023, 16:25.  INDICATIONS: foreign body ingestion / 3-5 ink pens 3-4 days ago, states \"they haven't passed\". LUQ pain.  TECHNIQUE: CT images were created without intravenous contrast.   PROTOCOL:   Standard imaging protocol performed    RADIATION:   DLP: 815.3mGy*cm   Automated exposure control was utilized to minimize radiation dose.  FINDINGS:  The " lung bases are clear.  Bilateral breast implants are noted.  The unenhanced liver, adrenal glands, kidneys, spleen, and pancreas are unremarkable.  The gallbladder is surgically absent.  There is pneumobilia, likely secondary to prior sphincterotomy.  Four radiopaque foreign bodies are present within the stomach.  The small bowel appears normal in caliber and configuration.  The colon appears normal.  The appendix is not visualized.  There is no ascites or loculated collection.  No abnormally enlarged lymph nodes are identified.  There is laxity along the anterior abdominal wall.  The rectum and urinary bladder are unremarkable.  The uterus is surgically absent.  No aggressive osseous lesions are identified.      Impression:   1. Four radiopaque foreign bodies within stomach. 2. Nonobstructive bowel gas pattern.     STEPH LINARES MD       Electronically Signed and Approved By: STEPH LINARES MD on 6/06/2023 at 21:38             XR Hand 2 View Left    Result Date: 5/14/2023  Narrative: XR HAND LEFT PA LATERAL AND OBLIQUE,  5/14/2023 1:34 PM CLINICAL HISTORY:  Hand pain. COMPARISON:  None. PROCEDURE COMMENTS: XR HAND LEFT PA LATERAL AND OBLIQUE FINDINGS: There is no fracture or traumatic malalignment. Joint spaces overall well-maintained for age. No periostitis.    Impression: No acute bony abnormality of the hand. - Note: Radiology results need to be interpreted within a comprehensive clinical context.  If you have questions about the radiology report, please contact the office of the ordering clinician.    XR Abdomen 2+ VW with Chest 1 VW    Addendum Date: 6/6/2023 Addendum:   ADDENDUM:  The initial radiographs were interpreted as a chest radiograph.  The views of the abdomen were not reviewed.  There are 4 small metallic densities projected over the left upper quadrant which may be within the bowel.  There is a moderate amount of fecal residue in the colon.  No small bowel dilatation is present.    Sukhdev BECERRIL  MD [place]       Electronically Signed and Approved By: Sukhdev Luciano MD on 6/06/2023 at 18:10             Result Date: 6/6/2023  Narrative: PROCEDURE: XR ABDOMEN 2+ VIEWS W CHEST 1 VW  COMPARISON: [facility], CR, ABDOMEN SINGLE AP, 9/14/2020, 7:42.  INDICATIONS: SWALLOWED FOREIGN OBJECT  FINDINGS:  The heart size is normal and the lungs are clear.  There are bilateral breast prostheses in place.  No radiopaque foreign bodies seen.      Impression:   1. No active disease 2. No significant radiopaque foreign bodies are identified     Sukhdev Luciano MD       Electronically Signed and Approved By: Sukhdev Luciano MD on 6/06/2023 at 16:56             CT Head Without Contrast    Result Date: 5/14/2023  Narrative: CT HEAD WO CONTRAST  5/14/2023 1:41 PM   CLINICAL HISTORY:  Fall.. COMPARISON:  None. PROCEDURE COMMENTS: Routine noncontrast head CT with multiplanar reconstructions. Examination limited by motion artifact. Dose 1 : CT DLP Total : 2700.89 mGycm DLP Spiral Max : 763.34 mGycm Maximum CTDI Vol : 46.67 mGy FINDINGS:   Ventricular size and configuration normal. No evidence of acute stroke, mass, or hemorrhage. No evidence of fracture or extra-axial collection. Included paranasal sinuses, mastoids, and orbits unremarkable.    Impression: Limited but grossly negative examination secondary to motion artifact. - Note: Radiology results need to be interpreted within a comprehensive clinical context.  If you have questions about the radiology report, please contact the office of the ordering clinician.    MRI Brain Without Contrast    Result Date: 5/19/2023  Narrative: MRI BRAIN WITHOUT CONTRAST,  5/19/2023 5:53 PM   CLINICAL HISTORY:  -Neuro deficit, acute, stroke suspected. COMPARISON:  None. PROCEDURE COMMENTS: Multiplanar multiecho MR imaging of the brain including standard spin echo and diffusion sequences. FINDINGS:   Midline structures normally formed. Ventricles normal. No evidence of acute stroke,  mass, or hemorrhage. Normal diffusion imaging. Major vascular flow voids preserved, suggesting patency. Included portions of the paranasal sinuses, mastoids, and orbits unremarkable. Perforated nasal septum also present on the previous CT.    Impression: No acute abnormality. Perforated nasal septum could be related to chronic granulomatous process. Please correlate clinically. - Note: Radiology results need to be interpreted within a comprehensive clinical context.  If you have questions about the radiology report, please contact the office of the ordering clinician.    XR Chest 1 View    Result Date: 5/20/2023  Narrative: XR CHEST AP PORTABLE,  5/20/2023 6:55 PM CLINICAL HISTORY:  -paper clip ingestion COMPARISON:  May 19, 2023. PROCEDURE COMMENTS: AP portable technique. FINDINGS: Support devices: No visible support devices. Heart and mediastinal contours within normal limits for technique.  No active failure, pneumonia, or visible effusion.  No visible pneumothorax.    Impression: No acute finding. - Note: Radiology results need to be interpreted within a comprehensive clinical context.  If you have questions about the radiology report, please contact the office of the ordering clinician.    XR Chest 1 View    Result Date: 5/19/2023  Narrative: XR CHEST AP PORTABLE,  5/19/2023 4:37 AM CLINICAL HISTORY:  -Swallowed ink pen COMPARISON:  05/15/2023 PROCEDURE COMMENTS: AP portable technique. FINDINGS: Support devices: No visible support devices. Heart and mediastinal contours within normal limits for technique.  No active failure, pneumonia, or visible effusion.  No visible pneumothorax.    Impression: No acute finding. - Note: Radiology results need to be interpreted within a comprehensive clinical context.  If you have questions about the radiology report, please contact the office of the ordering clinician.    XR Chest 1 View    Result Date: 5/15/2023  Narrative: XR CHEST AP PORTABLE,  5/15/2023 9:55 AM CLINICAL  HISTORY:  -swallowed two double A batteries COMPARISON:  Chest radiograph dated 5/14/2023 PROCEDURE COMMENTS: AP portable technique. FINDINGS: Support devices: No visible support devices. The cardiomediastinal silhouette is within normal limits. The lungs are clear without focal consolidation or pleural effusion. No pneumothorax. Incompletely visualized metallic densities projecting over the left upper quadrant, likely related to ingested batteries.    Impression: 1.  Incompletely visualized metallic densities projecting over the left upper quadrant, likely related to ingested batteries given the provided history. 2.  Clear lungs. - Note: Radiology results need to be interpreted within a comprehensive clinical context.  If you have questions about the radiology report, please contact the office of the ordering clinician.    Duplex Venous Lower Extremity - Left CAR    Result Date: 6/6/2023  Narrative:   Normal left lower extremity venous duplex scan.     XR Abdomen KUB    Result Date: 5/21/2023  Narrative: CR, ABDOMEN AP,  5/21/2023 7:25 AM CLINICAL HISTORY:  -Foreign body location / ,migration COMPARISON:  5/20/2023 PROCEDURE COMMENTS:  AP view(s) of the abdomen per protocol. FINDINGS: The foreign body compatible with a paper clip is unchanged overlying the LEFT and mid upper abdomen. Again this objects over the stomach and again it is partially uncoiled. The bowel gas pattern is unremarkable. No definite evidence of free air on this supine exam.    Impression: Unchanged position of the foreign body compatible with a paper clip - Note: Radiology results need to be interpreted within a comprehensive clinical context.  If you have questions about the radiology report, please contact the office of the ordering clinician.    XR Abdomen KUB    Result Date: 5/20/2023  Narrative: CR, ABDOMEN AP,  5/20/2023 6:55 PM CLINICAL HISTORY:  -paper clip ingestion COMPARISON:  May 19, 2023. PROCEDURE COMMENTS:  AP view(s) of the  abdomen per protocol. FINDINGS: Previous metallic foreign bodies projecting over the stomach are no longer visible. There is now, however, a partially uncoiled paper clip projecting over the stomach. Hepatomegaly continues. Bowel gas pattern is otherwise unremarkable.    Impression: Paper clip projects over the stomach, partially uncoiled. - Note: Radiology results need to be interpreted within a comprehensive clinical context.  If you have questions about the radiology report, please contact the office of the ordering clinician.    XR Abdomen KUB    Result Date: 5/19/2023  Narrative: CR, ABDOMEN AP,  5/19/2023 4:37 AM CLINICAL HISTORY:  -Swallowed ink pen COMPARISON:  05/15/2023 PROCEDURE COMMENTS:  AP view(s) of the abdomen per protocol. FINDINGS: Small adjacent metallic foreign bodies in the upper abdomen. Inferior to this also Another linear metallic body measuring 11 mm gas pattern is unremarkable    Impression: Several metallic foreign bodies projecting in the upper abdomen. - Note: Radiology results need to be interpreted within a comprehensive clinical context.  If you have questions about the radiology report, please contact the office of the ordering clinician.    XR Abdomen KUB    Result Date: 5/15/2023  Narrative: CR, ABDOMEN AP,  5/15/2023 9:55 AM CLINICAL HISTORY:  -? swallowed tele batteries COMPARISON:  Abdominal radiographs dated 5/14/2023 PROCEDURE COMMENTS:  AP view(s) of the abdomen per protocol. FINDINGS: 2 metallic densities in the shape of batteries projecting over the left upper quadrant in the region of the stomach. Gas and stool is visualized throughout the bowel nonobstructive pattern. The visualized lung bases are clear.    Impression: Metallic radiodensities projecting over the left upper quadrant in the region of the stomach, compatible with the provided history of two ingested batteries. - Note: Radiology results need to be interpreted within a comprehensive clinical context.  If you  have questions about the radiology report, please contact the office of the ordering clinician.    XR Abdomen KUB    Result Date: 5/14/2023  Narrative: CR, ABDOMEN AP,  5/14/2023 2:10 PM CLINICAL HISTORY:  -WITHDRAWAL -SWALLOWED FOREIGN BODY COMPARISON:  2020 PROCEDURE COMMENTS:  AP view(s) of the abdomen per protocol. FINDINGS: Nail clippers in the region of the distal esophagus/GE junction. There is also a small linear metallic foreign body in the similar region likely related to polypoid pan. No other definite radiopaque foreign body.    Impression: 2 metallic foreign bodies compatible with nail clippers and ballpoint pen in the GE junction/distal esophagus. - Note: Radiology results need to be interpreted within a comprehensive clinical context.  If you have questions about the radiology report, please contact the office of the ordering clinician.    XR Chest PA & Lateral    Result Date: 5/14/2023  Narrative: PA AND LATERAL CHEST X-RAY,  5/14/2023 1:32 PM CLINICAL HISTORY:  -WITHDRAWAL -SWALLOWED FOREIGN BODY COMPARISON:  None. PROCEDURE COMMENTS: Frontal and lateral views of the chest. FINDINGS: There is a swallowed foreign body in the distal esophagus compatible with a nail clippers. Cardiac size normal and lungs clear.    Impression: Swallowed foreign body compatible with nail clippers in the distal esophagus - Note: Radiology results need to be interpreted within a comprehensive clinical context.  If you have questions about the radiology report, please contact the office of the ordering clinician.    ESOPHAGOGASTRODUODENOSCOPY (EGD)    Result Date: 5/21/2023  Narrative: This result has an attachment that is not available. Table formatting from the original result was not included. Findings One paperclip in the body of the stomach, successfully removed with cold biopsy forceps Recommendation  - Foreign body successfully removed - 1:1 sitter - Psychiatric evaluation - Okay to resume diet - We will sign off  and please call with any questions or concerns Indication Foreign body in stomach, subsequent encounter Staff Staff Role Alejandro Campa MD Performing Provider Edwin Mills MD Anesthesiologist Ivan Ramos, JEFFREY Circulator Monica Kaye RN Endoscopy Nurse Medications See Anesthesia Record. Preprocedure A history and physical has been performed, and patient medication allergies have been reviewed. The patient's tolerance of previous anesthesia has been reviewed. The risks and benefits of the procedure and the sedation options and risks were discussed with the patient. All questions were answered and informed consent obtained. ASA 3 - Patient with severe systemic disease Details of the Procedure The patient underwent general anesthesia, which was administered by an anesthesia professional. The patient's blood pressure, heart rate, level of consciousness, oxygen, respirations, ECG and ETCO2 were monitored throughout the procedure. The scope was introduced through the mouth and advanced to the second part of the duodenum. Retroflexion was performed in the cardia. The patient experienced no blood loss. The procedure was not difficult. The patient tolerated the procedure well. There were no apparent adverse events. Patient provided education and educated on specific discharge instructions. Patient educated on medications given during the procedure and new medications for discharge.  Patient verbalizes understanding of discharge education. Patient stable and awaiting transport for discharge. Specimens No specimens collected    ESOPHAGOGASTRODUODENOSCOPY (EGD)    Result Date: 5/20/2023  Narrative: This result has an attachment that is not available. Table formatting from the original result was not included. Findings Solid food in the stomach and foreign body could not be retrieved or found/ Recommendation  Repeat EGD tomorrow mornign if foreign body remains in the stomach Patient to remain NPO Massive amounts of  food in the stomach that could not be cleared and foreign body not found Indication Swallowed foreign body, initial encounter Staff Staff Role Alejandro Campa MD Performing Provider Edwin Mills MD Anesthesiologist Ivan Ramos, JEFFREY Circulator Monica Kaye RN Endoscopy Nurse Medications See Anesthesia Record. Preprocedure A history and physical has been performed, and patient medication allergies have been reviewed. The patient's tolerance of previous anesthesia has been reviewed. The risks and benefits of the procedure and the sedation options and risks were discussed with the patient. All questions were answered and informed consent obtained. ASA 3 - Patient with severe systemic disease Details of the Procedure The patient underwent general anesthesia, which was administered by an anesthesia professional. The patient's blood pressure, heart rate, level of consciousness, oxygen, respirations, ECG and ETCO2 were monitored throughout the procedure. The scope was introduced through the mouth and advanced to the second part of the duodenum. Retroflexion was performed in the cardia. The patient experienced no blood loss. The procedure was not difficult. The patient tolerated the procedure well. There were no apparent adverse events. Patient provided education and educated on specific discharge instructions. Patient educated on medications given during the procedure and new medications for discharge.  Patient verbalizes understanding of discharge education. Patient stable and awaiting transport for discharge. Specimens No specimens collected    ESOPHAGOGASTRODUODENOSCOPY (EGD)    Result Date: 5/19/2023  Narrative: This result has an attachment that is not available.    ESOPHAGOGASTRODUODENOSCOPY (EGD)    Result Date: 5/15/2023  Narrative: This result has an attachment that is not available.    ESOPHAGOGASTRODUODENOSCOPY (EGD)    Result Date: 5/14/2023  Narrative: This result has an attachment that is not  available. Table formatting from the original result was not included. Findings Toe nail clippers and pen insert in the lower third of the esophagus, successfully removed with snare. There was associated pressure ulcer and inflammation in lower lower third of the esophagus. The stomach and duodenum appeared normal. Recommendation  Recommend sitter as patient is at risk for repeated foreign body ingestion due to psychiatric illness. PPI daily. Nothing further from GI stand point. GI will sign off. Please feel free to call with questions or concerns. Indication Swallowed foreign body, subsequent encounter Staff Staff Role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

## 2023-06-07 ENCOUNTER — APPOINTMENT (OUTPATIENT)
Dept: GENERAL RADIOLOGY | Facility: HOSPITAL | Age: 43
End: 2023-06-07
Payer: COMMERCIAL

## 2023-06-07 LAB
B-HCG UR QL: NEGATIVE
FERRITIN SERPL-MCNC: 32.68 NG/ML (ref 13–150)
FOLATE SERPL-MCNC: >20 NG/ML (ref 4.78–24.2)
HCT VFR BLD AUTO: 35.9 % (ref 34–46.6)
HCT VFR BLD AUTO: 37.8 % (ref 34–46.6)
HGB BLD-MCNC: 11.6 G/DL (ref 12–15.9)
HGB BLD-MCNC: 12.1 G/DL (ref 12–15.9)
IRON 24H UR-MRATE: 35 MCG/DL (ref 37–145)
IRON SATN MFR SERPL: 7 % (ref 20–50)
RETICS # AUTO: 0.14 10*6/MM3 (ref 0.02–0.13)
RETICS/RBC NFR AUTO: 3.47 % (ref 0.7–1.9)
TIBC SERPL-MCNC: 481 MCG/DL (ref 298–536)
TRANSFERRIN SERPL-MCNC: 323 MG/DL (ref 200–360)
VIT B12 BLD-MCNC: 613 PG/ML (ref 211–946)

## 2023-06-07 PROCEDURE — G0378 HOSPITAL OBSERVATION PER HR: HCPCS

## 2023-06-07 PROCEDURE — 73620 X-RAY EXAM OF FOOT: CPT

## 2023-06-07 PROCEDURE — 25010000002 SUGAMMADEX 200 MG/2ML SOLUTION

## 2023-06-07 PROCEDURE — 82607 VITAMIN B-12: CPT | Performed by: FAMILY MEDICINE

## 2023-06-07 PROCEDURE — 94799 UNLISTED PULMONARY SVC/PX: CPT

## 2023-06-07 PROCEDURE — 73630 X-RAY EXAM OF FOOT: CPT

## 2023-06-07 PROCEDURE — 84466 ASSAY OF TRANSFERRIN: CPT | Performed by: FAMILY MEDICINE

## 2023-06-07 PROCEDURE — 82746 ASSAY OF FOLIC ACID SERUM: CPT | Performed by: FAMILY MEDICINE

## 2023-06-07 PROCEDURE — 99214 OFFICE O/P EST MOD 30 MIN: CPT | Performed by: INTERNAL MEDICINE

## 2023-06-07 PROCEDURE — 83540 ASSAY OF IRON: CPT | Performed by: FAMILY MEDICINE

## 2023-06-07 PROCEDURE — 85014 HEMATOCRIT: CPT | Performed by: FAMILY MEDICINE

## 2023-06-07 PROCEDURE — 85045 AUTOMATED RETICULOCYTE COUNT: CPT | Performed by: FAMILY MEDICINE

## 2023-06-07 PROCEDURE — 85018 HEMOGLOBIN: CPT | Performed by: FAMILY MEDICINE

## 2023-06-07 PROCEDURE — 82728 ASSAY OF FERRITIN: CPT | Performed by: FAMILY MEDICINE

## 2023-06-07 PROCEDURE — 63710000001 DIPHENHYDRAMINE PER 50 MG: Performed by: PHYSICIAN ASSISTANT

## 2023-06-07 PROCEDURE — 94761 N-INVAS EAR/PLS OXIMETRY MLT: CPT

## 2023-06-07 PROCEDURE — 71046 X-RAY EXAM CHEST 2 VIEWS: CPT

## 2023-06-07 RX ORDER — POLYETHYLENE GLYCOL 3350 17 G/17G
17 POWDER, FOR SOLUTION ORAL 2 TIMES DAILY PRN
Status: DISCONTINUED | OUTPATIENT
Start: 2023-06-07 | End: 2023-06-08 | Stop reason: HOSPADM

## 2023-06-07 RX ORDER — ONDANSETRON 2 MG/ML
4 INJECTION INTRAMUSCULAR; INTRAVENOUS ONCE AS NEEDED
Status: DISCONTINUED | OUTPATIENT
Start: 2023-06-07 | End: 2023-06-07 | Stop reason: HOSPADM

## 2023-06-07 RX ORDER — AMOXICILLIN 250 MG
2 CAPSULE ORAL 2 TIMES DAILY
Status: DISCONTINUED | OUTPATIENT
Start: 2023-06-07 | End: 2023-06-07

## 2023-06-07 RX ORDER — MEPERIDINE HYDROCHLORIDE 25 MG/ML
12.5 INJECTION INTRAMUSCULAR; INTRAVENOUS; SUBCUTANEOUS
Status: DISCONTINUED | OUTPATIENT
Start: 2023-06-07 | End: 2023-06-07 | Stop reason: HOSPADM

## 2023-06-07 RX ORDER — VENLAFAXINE HYDROCHLORIDE 37.5 MG/1
37.5 CAPSULE, EXTENDED RELEASE ORAL DAILY
Status: DISCONTINUED | OUTPATIENT
Start: 2023-06-07 | End: 2023-06-07

## 2023-06-07 RX ORDER — FERROUS SULFATE 325(65) MG
325 TABLET ORAL
Status: DISCONTINUED | OUTPATIENT
Start: 2023-06-08 | End: 2023-06-08 | Stop reason: HOSPADM

## 2023-06-07 RX ORDER — SODIUM CHLORIDE 0.9 % (FLUSH) 0.9 %
10 SYRINGE (ML) INJECTION AS NEEDED
Status: DISCONTINUED | OUTPATIENT
Start: 2023-06-07 | End: 2023-06-08 | Stop reason: HOSPADM

## 2023-06-07 RX ORDER — DIPHENHYDRAMINE HCL 25 MG
25 CAPSULE ORAL ONCE
Status: COMPLETED | OUTPATIENT
Start: 2023-06-08 | End: 2023-06-07

## 2023-06-07 RX ORDER — SODIUM CHLORIDE, SODIUM LACTATE, POTASSIUM CHLORIDE, CALCIUM CHLORIDE 600; 310; 30; 20 MG/100ML; MG/100ML; MG/100ML; MG/100ML
100 INJECTION, SOLUTION INTRAVENOUS CONTINUOUS
Status: DISCONTINUED | OUTPATIENT
Start: 2023-06-07 | End: 2023-06-07

## 2023-06-07 RX ORDER — PROMETHAZINE HYDROCHLORIDE 25 MG/1
25 SUPPOSITORY RECTAL ONCE AS NEEDED
Status: DISCONTINUED | OUTPATIENT
Start: 2023-06-07 | End: 2023-06-07 | Stop reason: HOSPADM

## 2023-06-07 RX ORDER — SODIUM CHLORIDE, SODIUM LACTATE, POTASSIUM CHLORIDE, CALCIUM CHLORIDE 600; 310; 30; 20 MG/100ML; MG/100ML; MG/100ML; MG/100ML
9 INJECTION, SOLUTION INTRAVENOUS CONTINUOUS PRN
Status: DISCONTINUED | OUTPATIENT
Start: 2023-06-07 | End: 2023-06-08 | Stop reason: HOSPADM

## 2023-06-07 RX ORDER — PROMETHAZINE HYDROCHLORIDE 12.5 MG/1
25 TABLET ORAL ONCE AS NEEDED
Status: DISCONTINUED | OUTPATIENT
Start: 2023-06-07 | End: 2023-06-07 | Stop reason: HOSPADM

## 2023-06-07 RX ORDER — SODIUM CHLORIDE 0.9 % (FLUSH) 0.9 %
10 SYRINGE (ML) INJECTION EVERY 12 HOURS SCHEDULED
Status: DISCONTINUED | OUTPATIENT
Start: 2023-06-07 | End: 2023-06-08 | Stop reason: HOSPADM

## 2023-06-07 RX ORDER — MIDAZOLAM HYDROCHLORIDE 2 MG/2ML
2 INJECTION, SOLUTION INTRAMUSCULAR; INTRAVENOUS ONCE
Status: DISCONTINUED | OUTPATIENT
Start: 2023-06-07 | End: 2023-06-08 | Stop reason: HOSPADM

## 2023-06-07 RX ORDER — CHOLECALCIFEROL (VITAMIN D3) 125 MCG
5 CAPSULE ORAL NIGHTLY PRN
Status: DISCONTINUED | OUTPATIENT
Start: 2023-06-07 | End: 2023-06-08 | Stop reason: HOSPADM

## 2023-06-07 RX ORDER — ONDANSETRON 2 MG/ML
4 INJECTION INTRAMUSCULAR; INTRAVENOUS EVERY 6 HOURS PRN
Status: DISCONTINUED | OUTPATIENT
Start: 2023-06-07 | End: 2023-06-08 | Stop reason: HOSPADM

## 2023-06-07 RX ORDER — BISACODYL 10 MG
10 SUPPOSITORY, RECTAL RECTAL DAILY PRN
Status: DISCONTINUED | OUTPATIENT
Start: 2023-06-07 | End: 2023-06-08 | Stop reason: HOSPADM

## 2023-06-07 RX ORDER — VENLAFAXINE HYDROCHLORIDE 75 MG/1
75 CAPSULE, EXTENDED RELEASE ORAL DAILY
Status: DISCONTINUED | OUTPATIENT
Start: 2023-06-08 | End: 2023-06-08 | Stop reason: HOSPADM

## 2023-06-07 RX ORDER — HYDROXYZINE HYDROCHLORIDE 25 MG/1
25 TABLET, FILM COATED ORAL 3 TIMES DAILY PRN
Status: DISCONTINUED | OUTPATIENT
Start: 2023-06-07 | End: 2023-06-08 | Stop reason: HOSPADM

## 2023-06-07 RX ORDER — NAPROXEN 250 MG/1
250 TABLET ORAL 2 TIMES DAILY WITH MEALS
Status: DISCONTINUED | OUTPATIENT
Start: 2023-06-07 | End: 2023-06-08 | Stop reason: HOSPADM

## 2023-06-07 RX ORDER — SODIUM CHLORIDE 9 MG/ML
40 INJECTION, SOLUTION INTRAVENOUS AS NEEDED
Status: DISCONTINUED | OUTPATIENT
Start: 2023-06-07 | End: 2023-06-08 | Stop reason: HOSPADM

## 2023-06-07 RX ORDER — TRAZODONE HYDROCHLORIDE 50 MG/1
50 TABLET ORAL NIGHTLY
Status: DISCONTINUED | OUTPATIENT
Start: 2023-06-07 | End: 2023-06-08 | Stop reason: HOSPADM

## 2023-06-07 RX ORDER — POLYETHYLENE GLYCOL 3350 17 G/17G
17 POWDER, FOR SOLUTION ORAL DAILY PRN
Status: DISCONTINUED | OUTPATIENT
Start: 2023-06-07 | End: 2023-06-07

## 2023-06-07 RX ORDER — BUPRENORPHINE HYDROCHLORIDE AND NALOXONE HYDROCHLORIDE DIHYDRATE 8; 2 MG/1; MG/1
2 TABLET SUBLINGUAL DAILY
Status: DISCONTINUED | OUTPATIENT
Start: 2023-06-07 | End: 2023-06-08 | Stop reason: HOSPADM

## 2023-06-07 RX ORDER — TERAZOSIN 1 MG/1
1 CAPSULE ORAL NIGHTLY
Status: DISCONTINUED | OUTPATIENT
Start: 2023-06-07 | End: 2023-06-08 | Stop reason: HOSPADM

## 2023-06-07 RX ORDER — ALUMINA, MAGNESIA, AND SIMETHICONE 2400; 2400; 240 MG/30ML; MG/30ML; MG/30ML
15 SUSPENSION ORAL EVERY 6 HOURS PRN
Status: DISCONTINUED | OUTPATIENT
Start: 2023-06-07 | End: 2023-06-08 | Stop reason: HOSPADM

## 2023-06-07 RX ORDER — BISACODYL 5 MG/1
5 TABLET, DELAYED RELEASE ORAL DAILY PRN
Status: DISCONTINUED | OUTPATIENT
Start: 2023-06-07 | End: 2023-06-08 | Stop reason: HOSPADM

## 2023-06-07 RX ORDER — NICOTINE 21 MG/24HR
1 PATCH, TRANSDERMAL 24 HOURS TRANSDERMAL DAILY PRN
Status: DISCONTINUED | OUTPATIENT
Start: 2023-06-07 | End: 2023-06-08 | Stop reason: HOSPADM

## 2023-06-07 RX ORDER — LIDOCAINE 50 MG/G
1 PATCH TOPICAL DAILY PRN
Status: DISCONTINUED | OUTPATIENT
Start: 2023-06-07 | End: 2023-06-08 | Stop reason: HOSPADM

## 2023-06-07 RX ADMIN — Medication 10 ML: at 20:14

## 2023-06-07 RX ADMIN — DIPHENHYDRAMINE HYDROCHLORIDE 25 MG: 25 CAPSULE ORAL at 23:57

## 2023-06-07 RX ADMIN — SODIUM CHLORIDE, POTASSIUM CHLORIDE, SODIUM LACTATE AND CALCIUM CHLORIDE 100 ML/HR: 600; 310; 30; 20 INJECTION, SOLUTION INTRAVENOUS at 01:16

## 2023-06-07 RX ADMIN — Medication 5 MG: at 20:09

## 2023-06-07 RX ADMIN — SENNOSIDES AND DOCUSATE SODIUM 2 TABLET: 50; 8.6 TABLET ORAL at 08:12

## 2023-06-07 RX ADMIN — TERAZOSIN HYDROCHLORIDE 1 MG: 1 CAPSULE ORAL at 20:08

## 2023-06-07 RX ADMIN — SUGAMMADEX 400 MG: 100 INJECTION, SOLUTION INTRAVENOUS at 00:13

## 2023-06-07 RX ADMIN — HYDROXYZINE HYDROCHLORIDE 25 MG: 25 TABLET, FILM COATED ORAL at 20:13

## 2023-06-07 RX ADMIN — VENLAFAXINE HYDROCHLORIDE 37.5 MG: 37.5 CAPSULE, EXTENDED RELEASE ORAL at 10:44

## 2023-06-07 RX ADMIN — BUPRENORPHINE AND NALOXONE 2 TABLET: 8; 2 TABLET SUBLINGUAL at 10:44

## 2023-06-07 RX ADMIN — NAPROXEN 250 MG: 250 TABLET ORAL at 20:13

## 2023-06-07 RX ADMIN — DICLOFENAC SODIUM 4 G: 10 GEL TOPICAL at 20:13

## 2023-06-07 NOTE — ANESTHESIA PREPROCEDURE EVALUATION
Anesthesia Evaluation     Patient summary reviewed and Nursing notes reviewed   no history of anesthetic complications:   NPO Solid Status: > 8 hours  NPO Liquid Status: > 2 hours           Airway   Mallampati: II  TM distance: >3 FB  Neck ROM: full  No difficulty expected  Dental      Pulmonary - negative pulmonary ROS and normal exam    breath sounds clear to auscultation  (+) a smoker Current,  Cardiovascular - negative cardio ROS and normal exam  Exercise tolerance: good (4-7 METS)    Rhythm: regular  Rate: normal        Neuro/Psych- negative ROS  GI/Hepatic/Renal/Endo - negative ROS     ROS Comment: For metallic foreign objects in stomach    Musculoskeletal (-) negative ROS    Abdominal    Substance History - negative use  (+) alcohol use, drug use     OB/GYN negative ob/gyn ROS         Other - negative ROS       ROS/Med Hx Other: PAT Nursing Notes unavailable.                   Anesthesia Plan    ASA 3 - emergent     general       Anesthetic plan, risks, benefits, and alternatives have been provided, discussed and informed consent has been obtained with: patient.    CODE STATUS:    Level Of Support Discussed With: Patient  Code Status (Patient has no pulse and is not breathing): CPR (Attempt to Resuscitate)  Medical Interventions (Patient has pulse or is breathing): Full Support

## 2023-06-07 NOTE — H&P
[facility] ERIST HISTORY AND PHYSICAL  Date: 2023   Patient Name: Ashley Fried  : 1980  MRN: [id number]  Primary Care Physician:  Provider, No Known  Date of admission: 2023    Subjective   Subjective     Chief Complaint: Abdominal pain    HPI:    Ashley Fried is a 43 y.o. female brought into the emergency department for evaluation of abdominal pain.  Patient's epigastric pain started approximately 3 days ago.  Patient reports consuming multiple ink pens on Saturday.  She also reports nausea constipation, chills.  Denies passing thing pens with bowel movements.  Patient has a history of pica.      Personal History     Past Medical History:  Past Medical History:   Diagnosis Date    Cancer     OVARIAN AND BLADDER         Past Surgical History:  Past Surgical History:   Procedure Laterality Date    BREAST SURGERY      HYSTERECTOMY           Family History:   History reviewed. No pertinent family history.      Social History:   Social History     Socioeconomic History    Marital status:    Tobacco Use    Smoking status: Every Day     Packs/day: 1.00     Years: 25.00     Pack years: 25.00     Types: Cigarettes   Substance and Sexual Activity    Alcohol use: Not Currently     Comment: Soder since 22    Drug use: Not Currently    Sexual activity: Defer         Home Medications:  buprenorphine-naloxone, famotidine, furosemide, melatonin, ondansetron ODT, potassium chloride, promethazine, and traZODone    Allergies:  Allergies   Allergen Reactions    Tylenol [Acetaminophen] Anaphylaxis    Morphine Other (See Comments)     STATES IT MAKES HER CRAZY    Penicillins Unknown - Low Severity     STATES MOM TOLD HER AS CHILD       Review of Systems   All systems were reviewed and negative except for: Abdominal pain, nausea, constipation    Objective   Objective     Vitals:   Temp:  [98.4 °F (36.9 °C)] 98.4 °F (36.9 °C)  Heart Rate:  [] 81  Resp:  [20] 20  BP: (105-121)/(54-73)  110/73    Physical Exam: Evaluated in PACU following EGD    Constitutional: Awake, alert, no acute distress,   Eyes: Pupils equal, sclerae anicteric, no conjunctival injection   HENT: NCAT, mucous membranes moist   Neck: Supple, no thyromegaly, no lymphadenopathy, trachea midline   Respiratory: Clear to auscultation bilaterally, nonlabored respirations    Cardiovascular: RRR, no murmurs, rubs, or gallops, palpable pedal pulses bilaterally   Gastrointestinal: Positive bowel sounds, soft, nontender, nondistended   Musculoskeletal: No bilateral ankle edema, no clubbing or cyanosis to extremities   Psychiatric: Appropriate affect, cooperative   Neurologic: Oriented x 3, strength symmetric in all extremities, Cranial Nerves grossly intact to confrontation, speech clear   Skin: No rashes     Result Review    Result Review:  I have personally reviewed the results from the time of this admission to 6/6/2023 22:56 EDT and agree with these findings:  [x]  Laboratory  []  Microbiology  [x]  Radiology  []  EKG/Telemetry   []  Cardiology/Vascular   []  Pathology  []  Old records  []  Other:      Assessment & Plan   Assessment / Plan     Assessment/Plan:   Ingestion of foreign bodies-radiologic evidence of foreign bodies within the stomach.  GIs been consulted, Dr. Corrigan, who agreed to take the patient to the OR for EGD.  Patient was  Anemia, unknown etiology -given the foreign bodies in the stomach, trend hemoglobins, check fecal Hemoccult.  No indication for transfusion at this time, will be continually monitored.  Anemia panel.      DVT prophylaxis: SCDs    CODE STATUS:    Level Of Support Discussed With: Patient  Code Status (Patient has no pulse and is not breathing): CPR (Attempt to Resuscitate)  Medical Interventions (Patient has pulse or is breathing): Full Support      Admission Status:  I believe this patient meets observation status.    Electronically signed by Paul Soria DO, 06/06/23, 10:56 PM EDT.

## 2023-06-07 NOTE — CONSULTS
" [facility]   PSYCHIATRIC CONSULTATION    Patient Name: Ashley Fried  : 1980  MRN: [id number]  Primary Care Physician:  Provider, No Known  Date of admission: 2023    Referring Provider: Dr. Hsieh  Reason for Consultation: Ingestion of foreign bodies    Subjective   Subjective     Chief Complaint: \"I was having swelling in the legs, and also had a swallowing episode.\"    HPI:     Ashley Fried is a 43 y.o. female that is at [facility] for treatment of alcohol misuse as well as some ink pens.  She also reports her legs are very swollen and sore.  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  Patient reports that swallowing dependence was not a suicide attempt and had no thoughts of hurting herself.  Patient reports she has no idea why she swallowed them.  She expected some sort of relief but not sure what.  She reports she has no thoughts of swallowing anything at this time.  Patient reports that she has done this in the past and just gets a compulsion or strong urge that she eventually cannot resist and has to swallow something.  We discussed other things that she could do including putting a rubber band around her wrist and snapping it.    She reports that she had COVID in  and had a protracted course and it took her a long time to regain the ability to walk continues to have soreness and swelling of her legs.  She reports that she hopes to get up and around soon so that she get back to [facility] for treatment of her alcohol problem.  She is very hopeful for continued sobriety and wants to return as soon as possible.    She does acknowledge depression has been started on venlafaxine at 37.5 mg.  We will increase the dose to 75 mg and she is agreeable.  She is calm and cooperative throughout the interview.  She denies any acute depression.  She denies any suicidal ideation.  She no longer has any compulsion to swallow anything.  That she got a compulsion because she " was having a hard time walking, had the leg swelling, was worried about her sobriety and acted in a way that she regrets.      Review of Systems   All systems were reviewed and negative except for: Leg swelling    Personal History     Past Medical History:   Diagnosis Date    Cancer     OVARIAN AND BLADDER       Past Surgical History:   Procedure Laterality Date    BREAST SURGERY      ENDOSCOPY N/A 6/6/2023    Procedure: ESOPHAGOGASTRODUODENOSCOPY WITH FOREIGN BODY REMOVAL;  Surgeon: Jarrod Corrigan MD;  Location: [facility];  Service: Gastroenterology;  Laterality: N/A;  FOUR INK PEN C[facility]ERS ([facility])    HYSTERECTOMY         Past Psychiatric History: Does not have a current psychiatrist but is on a waiting list to see one and hopes to have an appointment in the next 2 weeks began seeing a psychiatrist at age 13.  Has a long history of depression and anxiety    Psychiatric Hospitalizations: She was admitted [facility] in 2020.    Suicide Attempts: Denies    Prior Treatment and Medications Tried: Does not recall but has been on multiple meds in the past        Family History: family history is not on file. Otherwise pertinent FHx was reviewed and not pertinent to current issue.    Social History:     Social History     Socioeconomic History    Marital status:    Tobacco Use    Smoking status: Every Day     Packs/day: 1.00     Years: 25.00     Pack years: 25.00     Types: Cigarettes   Vaping Use    Vaping Use: Never used   Substance and Sexual Activity    Alcohol use: Not Currently     Comment: Soder since 5-19-23    Drug use: Not Currently    Sexual activity: Defer       Substance Abuse History: reports that she has been smoking cigarettes. She has a 25.00 pack-year smoking history. She does not have any smokeless tobacco history on file. She reports that she does not currently use alcohol. She reports that she does not currently use drugs.    Home Medications:  buprenorphine-naloxone, furosemide,  "ibuprofen, melatonin, multivitamin with minerals, potassium chloride, prazosin, traZODone, and venlafaxine XR      Allergies:  Allergies   Allergen Reactions    Tylenol [Acetaminophen] Anaphylaxis    Librium [Chlordiazepoxide] GI Intolerance    Morphine Other (See Comments)     STATES IT MAKES HER CRAZY    Penicillins Unknown - Low Severity     STATES MOM TOLD HER AS CHILD       Objective   Objective     Vitals:   Temp:  [97.8 °F (36.6 °C)-98.8 °F (37.1 °C)] 98.8 °F (37.1 °C)  Heart Rate:  [70-98] 75  Resp:  [16-18] 18  BP: (108-150)/(53-78) 118/57  Flow (L/min):  [2] 2  Physical Exam       Mental Status Exam:     Awake, alert, oriented female appears appropriate stated age.  She is calm, cooperative, engaging, makes good eye contact.  There is no psychomotor restlessness or agitation    Hygiene:   good  Cooperation:  Cooperative  Eye Contact:  Good  Psychomotor Behavior:  Appropriate  Mood: \"Anxious to get back to recovery\" overall good and euthymic  Affect:  Appropriate  Speech:  Normal  Language: Appropriate, relevant  Thought Process:  Goal directed and Linear  Thought Content:  Normal  Suicidal:  None  Homicidal:  None  Hallucinations:  None  Delusion:  None  Memory:  Intact  Orientation:  Person, Place, Time, and Situation  Reliability:  good  Insight:  Fair  Judgement:  Impaired  Impulse Control:  Impaired    Result Review    Result Review:  I have personally reviewed the results from the time of this admission to 6/7/2023 18:23 EDT and agree with these findings:  []  Laboratory  []  Microbiology  []  Radiology  []  EKG/Telemetry   []  Cardiology/Vascular   []  Pathology  []  Old records  []  Other:  Most notable findings include:     Assessment & Plan   Assessment / Plan     Brief Patient Summary:  Ashley Fried is a 43 y.o. female who admitted for leg swelling and swallowing foreign objects    Active Hospital Problems:  Active Hospital Problems    Diagnosis     **Foreign body in stomach          Plan:   1) " increase venlafaxine to 75 mg  2) discontinue sitter, processed with the patient any desires or compulsions as well as things that she does not have at this time.  States that she is able to usually keep him in bed a and can do that now.  States that she will talk to someone if she feels this can happen again not do it  3) no need for acute inpatient psychiatric care May [facility]        Part of this note may be an electronic transcription/translation of spoken language to printed text using the Dragon Dictation System.    Electronically signed by Ric Watkins MD, 06/07/23, 6:23 PM EDT.

## 2023-06-07 NOTE — PLAN OF CARE
Goal Outcome Evaluation:         Patient was admit from PACU on shift. Patient is A&O x4. Patient was placed on 2L NC. No complaints from patient at this time. Will continue to monitor.

## 2023-06-07 NOTE — CONSULTS
Chief Complaint  Abdominal Pain, Leg Swelling, and Swallowed Foreign Body    History of Present Illness  Ashley Fried is a 43 y.o. female who with past medical history significant for ovarian/bladder cancer, pica, foreign body in ingestion causing small bowel obstruction and needing laparotomy, bipolar disorder, borderline personality, substance abuse, tobacco abuse, breast surgery, hysterectomy depression presents to [facility] EMERGENCY ROOM on referral from No ref. provider found for a gastroenterology evaluation of history of foreign body ingestion.  Patient denies any abdominal pain neck pain.  [facility] was upset at [facility] yesterday when she decided to swallow some independence.  On CT scan there a few metallic foreign bodies in the stomach possibly the tip of the implants.        Labs Result Review Imaging    Past Medical History:   Diagnosis Date    Cancer     OVARIAN AND BLADDER       Past Surgical History:   Procedure Laterality Date    BREAST SURGERY      HYSTERECTOMY           Current Facility-Administered Medications:     sodium chloride 0.9 % flush 10 mL, 10 mL, Intravenous, PRN, Trevon Arthur, DO    Current Outpatient Medications:     buprenorphine-naloxone (SUBOXONE) 8-2 MG per SL tablet, Place 2 tablets under the tongue Daily., Disp: , Rfl:     furosemide (Lasix) 40 MG tablet, Take 1 tablet by mouth Daily., Disp: , Rfl:     ibuprofen (ADVIL,MOTRIN) 400 MG tablet, Take 1 tablet by mouth Every 8 (Eight) Hours As Needed for Mild Pain., Disp: , Rfl:     melatonin 5 MG sublingual tablet sublingual tablet, Place 2 tablets under the tongue Every Night., Disp: , Rfl:     multivitamin with minerals tablet tablet, Take 1 tablet by mouth Daily., Disp: , Rfl:     potassium chloride (K-DUR,KLOR-CON) 20 MEQ CR tablet, Take 1 tablet by mouth Daily., Disp: , Rfl:     prazosin (MINIPRESS) 1 MG capsule, Take 1 capsule by mouth Every Night., Disp: , Rfl:     traZODone (DESYREL) 50 MG tablet, Take 1  "tablet by mouth Every Night., Disp: , Rfl:     venlafaxine XR (EFFEXOR-XR) 37.5 MG 24 hr capsule, Take 1 capsule by mouth Daily., Disp: , Rfl:      Allergies   Allergen Reactions    Tylenol [Acetaminophen] Anaphylaxis    Morphine Other (See Comments)     STATES IT MAKES HER CRAZY    Penicillins Unknown - Low Severity     STATES MOM TOLD HER AS CHILD       History reviewed. No pertinent family history.  No family history of GI malignancy    Social history is positive for alcohol use positive for drug use positive for smoking      Immunization:    There is no immunization history on file for this patient.     Objective     Review of Systems 10 system review is negative except as mentioned in HPI    Vital Signs:   /73   Pulse 81   Temp 98.4 °F (36.9 °C) (Oral)   Resp 20   Ht 167.6 cm (66\")   Wt 97.3 kg (214 lb 8.1 oz)   SpO2 92%   BMI 34.62 kg/m²       Physical Exam  Constitutional:       General: She is awake. She is not in acute distress.     Appearance: Normal appearance. She is well-developed and well-groomed.   HENT:      Head: Normocephalic and atraumatic.      Mouth/Throat:      Mouth: Mucous membranes are moist.      Comments: Ruth dental hygiene is good  Eyes:      General: Lids are normal.      Conjunctiva/sclera: Conjunctivae normal.      Pupils: Pupils are equal, round, and reactive to light.   Neck:      Thyroid: No thyroid mass.      Trachea: Trachea normal.   Cardiovascular:      Rate and Rhythm: Normal rate and regular rhythm.      Heart sounds: Normal heart sounds.   Pulmonary:      Effort: Pulmonary effort is normal.      Breath sounds: Normal breath sounds and air entry.   Abdominal:      General: Abdomen is flat. Bowel sounds are normal. There is no distension.      Palpations: Abdomen is soft. There is no mass.      Tenderness: There is no abdominal tenderness. There is no guarding.   Musculoskeletal:      Cervical back: Neck supple.      Right lower leg: No edema.      Left lower leg: " No edema.   Skin:     General: Skin is warm and moist.      Coloration: Skin is not cyanotic, jaundiced or pale.      Findings: No rash.      Nails: There is no clubbing.   Neurological:      Mental Status: She is alert and oriented to person, place, and time.   Psychiatric:         Attention and Perception: Attention normal.         Mood and Affect: Mood and affect normal.         Speech: Speech normal.       Labs:  Results from last 7 days   Lab Units 06/06/23  1425 06/01/23  1041   WBC 10*3/mm3 5.49 6.36   HEMOGLOBIN g/dL 11.4* 11.9*   HEMATOCRIT % 35.5 41.5   PLATELETS 10*3/mm3 242 268      Results from last 7 days   Lab Units 06/06/23  1425 06/01/23  1041   SODIUM mmol/L 140 134*   POTASSIUM mmol/L 4.0 4.8   CHLORIDE mmol/L 102 104   CO2 mmol/L 27.1 19.0*   BUN mg/dL 10 5*   CREATININE mg/dL 0.61 0.48*   CALCIUM mg/dL 8.9 8.7   BILIRUBIN mg/dL 0.3 0.4   ALK PHOS U/L 185* 171*   ALT (SGPT) U/L 12 22   AST (SGOT) U/L 17 28   GLUCOSE mg/dL 131* 90             Assessment & Plan:  Principal Problem:    Foreign body in stomach     Plan to do EGD tonight.  Risk and benefits fully discussed with the patient including risk of perforation, need for emergency surgery, failure to remove the foreign body from the stomach.  Patient was given instructions and counseling regarding her condition or for health maintenance advice. Please see specific information pulled into the AVS if appropriate.        Signed:  Benjie Corrigan MD  06/06/23  23:29 EDT

## 2023-06-07 NOTE — ANESTHESIA POSTPROCEDURE EVALUATION
Patient: Ashley Fried    Procedure Summary       Date: 06/06/23 Room / Location: [facility] OR 06 / [facility] MAIN OR    Anesthesia Start: 2336 Anesthesia Stop: 06/07/23 0022    Procedure: ESOPHAGOGASTRODUODENOSCOPY WITH FOREIGN BODY REMOVAL Diagnosis:       Foreign body in stomach      (Foreign body in stomach [T18.2XXA])    Surgeons: Jarrod Corrigan MD Provider: Sd Cruz MD    Anesthesia Type: general ASA Status: 3 - Emergent            Anesthesia Type: general    Vitals  Vitals Value Taken Time   /76 06/07/23 0039   Temp 36.6 °C (97.8 °F) 06/07/23 0022   Pulse 78 06/07/23 0043   Resp 16 06/07/23 0037   SpO2 93 % 06/07/23 0043   Vitals shown include unvalidated device data.        Post Anesthesia Care and Evaluation    Patient location during evaluation: bedside  Patient participation: complete - patient participated  Level of consciousness: awake  Pain management: adequate    Airway patency: patent  PONV Status: none  Cardiovascular status: acceptable and stable  Respiratory status: acceptable  Hydration status: acceptable    Comments: An Anesthesiologist personally participated in the most demanding procedures (including induction and emergence if applicable) in the anesthesia plan, monitored the course of anesthesia administration at frequent intervals and remained physically present and available for immediate diagnosis and treatment of emergencies.

## 2023-06-08 VITALS
TEMPERATURE: 98.4 F | OXYGEN SATURATION: 96 % | WEIGHT: 214.51 LBS | SYSTOLIC BLOOD PRESSURE: 139 MMHG | HEART RATE: 68 BPM | DIASTOLIC BLOOD PRESSURE: 76 MMHG | BODY MASS INDEX: 34.47 KG/M2 | HEIGHT: 66 IN | RESPIRATION RATE: 20 BRPM

## 2023-06-08 LAB
ALBUMIN SERPL-MCNC: 3.6 G/DL (ref 3.5–5.2)
ALP SERPL-CCNC: 202 U/L (ref 39–117)
ALT SERPL W P-5'-P-CCNC: 12 U/L (ref 1–33)
ANION GAP SERPL CALCULATED.3IONS-SCNC: 8.9 MMOL/L (ref 5–15)
AST SERPL-CCNC: 16 U/L (ref 1–32)
BASOPHILS # BLD AUTO: 0.02 10*3/MM3 (ref 0–0.2)
BASOPHILS NFR BLD AUTO: 0.3 % (ref 0–1.5)
BILIRUB CONJ SERPL-MCNC: <0.2 MG/DL (ref 0–0.3)
BILIRUB INDIRECT SERPL-MCNC: ABNORMAL MG/DL
BILIRUB SERPL-MCNC: 0.3 MG/DL (ref 0–1.2)
BUN SERPL-MCNC: 13 MG/DL (ref 6–20)
BUN/CREAT SERPL: 20.6 (ref 7–25)
CALCIUM SPEC-SCNC: 8.9 MG/DL (ref 8.6–10.5)
CHLORIDE SERPL-SCNC: 103 MMOL/L (ref 98–107)
CO2 SERPL-SCNC: 25.1 MMOL/L (ref 22–29)
CREAT SERPL-MCNC: 0.63 MG/DL (ref 0.57–1)
DEPRECATED RDW RBC AUTO: 50.1 FL (ref 37–54)
EGFRCR SERPLBLD CKD-EPI 2021: 112.3 ML/MIN/1.73
EOSINOPHIL # BLD AUTO: 0.06 10*3/MM3 (ref 0–0.4)
EOSINOPHIL NFR BLD AUTO: 0.9 % (ref 0.3–6.2)
ERYTHROCYTE [DISTWIDTH] IN BLOOD BY AUTOMATED COUNT: 14.4 % (ref 12.3–15.4)
GLUCOSE SERPL-MCNC: 110 MG/DL (ref 65–99)
HCT VFR BLD AUTO: 35.2 % (ref 34–46.6)
HCT VFR BLD AUTO: 35.9 % (ref 34–46.6)
HGB BLD-MCNC: 11.3 G/DL (ref 12–15.9)
HGB BLD-MCNC: 11.5 G/DL (ref 12–15.9)
IMM GRANULOCYTES # BLD AUTO: 0.01 10*3/MM3 (ref 0–0.05)
IMM GRANULOCYTES NFR BLD AUTO: 0.1 % (ref 0–0.5)
LYMPHOCYTES # BLD AUTO: 2.35 10*3/MM3 (ref 0.7–3.1)
LYMPHOCYTES NFR BLD AUTO: 34.9 % (ref 19.6–45.3)
MAGNESIUM SERPL-MCNC: 2.1 MG/DL (ref 1.6–2.6)
MCH RBC QN AUTO: 30.7 PG (ref 26.6–33)
MCHC RBC AUTO-ENTMCNC: 32.1 G/DL (ref 31.5–35.7)
MCV RBC AUTO: 95.7 FL (ref 79–97)
MONOCYTES # BLD AUTO: 0.81 10*3/MM3 (ref 0.1–0.9)
MONOCYTES NFR BLD AUTO: 12 % (ref 5–12)
NEUTROPHILS NFR BLD AUTO: 3.49 10*3/MM3 (ref 1.7–7)
NEUTROPHILS NFR BLD AUTO: 51.8 % (ref 42.7–76)
NRBC BLD AUTO-RTO: 0 /100 WBC (ref 0–0.2)
PHOSPHATE SERPL-MCNC: 3.3 MG/DL (ref 2.5–4.5)
PLATELET # BLD AUTO: 250 10*3/MM3 (ref 140–450)
PMV BLD AUTO: 9.1 FL (ref 6–12)
POTASSIUM SERPL-SCNC: 4.2 MMOL/L (ref 3.5–5.2)
PROT SERPL-MCNC: 6.9 G/DL (ref 6–8.5)
RBC # BLD AUTO: 3.68 10*6/MM3 (ref 3.77–5.28)
SODIUM SERPL-SCNC: 137 MMOL/L (ref 136–145)
WBC NRBC COR # BLD: 6.74 10*3/MM3 (ref 3.4–10.8)

## 2023-06-08 PROCEDURE — 94799 UNLISTED PULMONARY SVC/PX: CPT

## 2023-06-08 PROCEDURE — 83735 ASSAY OF MAGNESIUM: CPT | Performed by: INTERNAL MEDICINE

## 2023-06-08 PROCEDURE — 85014 HEMATOCRIT: CPT | Performed by: FAMILY MEDICINE

## 2023-06-08 PROCEDURE — G0378 HOSPITAL OBSERVATION PER HR: HCPCS

## 2023-06-08 PROCEDURE — 80076 HEPATIC FUNCTION PANEL: CPT | Performed by: INTERNAL MEDICINE

## 2023-06-08 PROCEDURE — 84100 ASSAY OF PHOSPHORUS: CPT | Performed by: INTERNAL MEDICINE

## 2023-06-08 PROCEDURE — 97166 OT EVAL MOD COMPLEX 45 MIN: CPT

## 2023-06-08 PROCEDURE — 80048 BASIC METABOLIC PNL TOTAL CA: CPT | Performed by: INTERNAL MEDICINE

## 2023-06-08 PROCEDURE — 85018 HEMOGLOBIN: CPT | Performed by: FAMILY MEDICINE

## 2023-06-08 PROCEDURE — 99239 HOSP IP/OBS DSCHRG MGMT >30: CPT | Performed by: INTERNAL MEDICINE

## 2023-06-08 PROCEDURE — 85025 COMPLETE CBC W/AUTO DIFF WBC: CPT | Performed by: INTERNAL MEDICINE

## 2023-06-08 RX ORDER — ACETAMINOPHEN 500 MG
1000 TABLET ORAL EVERY 6 HOURS PRN
Qty: 300 TABLET | Refills: 0 | Status: SHIPPED | OUTPATIENT
Start: 2023-06-08

## 2023-06-08 RX ORDER — ACETAMINOPHEN 500 MG
1000 TABLET ORAL 3 TIMES DAILY
Status: DISCONTINUED | OUTPATIENT
Start: 2023-06-08 | End: 2023-06-08 | Stop reason: HOSPADM

## 2023-06-08 RX ORDER — ACETAMINOPHEN 325 MG/1
650 TABLET ORAL EVERY 6 HOURS PRN
Status: DISCONTINUED | OUTPATIENT
Start: 2023-06-08 | End: 2023-06-08 | Stop reason: HOSPADM

## 2023-06-08 RX ORDER — VENLAFAXINE HYDROCHLORIDE 75 MG/1
75 CAPSULE, EXTENDED RELEASE ORAL DAILY
Qty: 30 CAPSULE | Refills: 0 | Status: SHIPPED | OUTPATIENT
Start: 2023-06-09 | End: 2023-07-09

## 2023-06-08 RX ORDER — FERROUS SULFATE 325(65) MG
325 TABLET ORAL
Qty: 90 TABLET | Refills: 0 | Status: SHIPPED | OUTPATIENT
Start: 2023-06-09 | End: 2023-09-07

## 2023-06-08 RX ADMIN — Medication 10 ML: at 08:02

## 2023-06-08 RX ADMIN — BUPRENORPHINE AND NALOXONE 2 TABLET: 8; 2 TABLET SUBLINGUAL at 08:00

## 2023-06-08 RX ADMIN — FERROUS SULFATE TAB 325 MG (65 MG ELEMENTAL FE) 325 MG: 325 (65 FE) TAB at 08:01

## 2023-06-08 RX ADMIN — DICLOFENAC SODIUM 4 G: 10 GEL TOPICAL at 12:49

## 2023-06-08 RX ADMIN — DICLOFENAC SODIUM 4 G: 10 GEL TOPICAL at 08:02

## 2023-06-08 RX ADMIN — NAPROXEN 250 MG: 250 TABLET ORAL at 08:01

## 2023-06-08 RX ADMIN — VENLAFAXINE HYDROCHLORIDE 75 MG: 75 CAPSULE, EXTENDED RELEASE ORAL at 08:01

## 2023-06-08 NOTE — NURSING NOTE
Wrapped left ankle in ace bandage to provide support for swollen ankle. No order for a soft boot was given.   Home

## 2023-06-08 NOTE — PLAN OF CARE
Goal Outcome Evaluation:  Plan of Care Reviewed With: patient        Progress: no change (first session: evaluation)  Outcome Evaluation: Patient presents with limitations of balance, endurance/ activity tolerance and cognition which are impacting ADL and transfer independence Skilled OT is indicated to remediate/compensate for deficits to maximize independence and safety with functional tasks.

## 2023-06-08 NOTE — THERAPY EVALUATION
Patient Name: Ashley Fried  : 1980    MRN: [id number]                              Today's Date: 2023       Admit Date: 2023    Visit Dx:     ICD-10-CM ICD-9-CM   1. Foreign body in stomach  T18.2XXA 935.2   2. Decreased activities of daily living (ADL)  Z78.9 V49.89     Patient Active Problem List   Diagnosis    Foreign body in stomach     Past Medical History:   Diagnosis Date    Cancer     OVARIAN AND BLADDER     Past Surgical History:   Procedure Laterality Date    BREAST SURGERY      ENDOSCOPY N/A 2023    Procedure: ESOPHAGOGASTRODUODENOSCOPY WITH FOREIGN BODY REMOVAL;  Surgeon: Jarrod Corrigan MD;  Location: [facility] MAIN OR;  Service: Gastroenterology;  Laterality: N/A;  FOUR INK PEN CENTERS (STRAWS)    HYSTERECTOMY        General Information       Row Name 23 1027          OT Time and Intention    Document Type evaluation  -AV     Mode of Treatment individual therapy;occupational therapy  -AV       Row Name 23 1027          General Information    Patient Profile Reviewed yes  -AV     Prior Level of Function independent:;ADL's;home management;all household mobility;transfer  both sits/stands to shower (walk-in shower with seat). stands at sink to groom. standard commode. ambulates without device. no home O2.  -AV     Existing Precautions/Restrictions fall  -AV     Barriers to Rehab cognitive status  impaired insight  -AV       Row Name 23 1027          Occupational Profile    Reason for Services/Referral (Occupational Profile) Patient is a 43 year old female admitted [facility] on 23 with lower extremity edema and swallowing of foreign objects. Per EMR review, she has history of multiple hospitalizations due to compulsion to swallow objects to include: lighter, piece of eyeglasses, 2 AA batteries, paperclip, pennies, pens, clippers, pencils and chopstick. She is currently on 4NT/ room air. OT consulted due to recent decline in ADL/transfer  independence. No previous OT services for current condition.  -AV       Row Name 06/08/23 1027          Living Environment    People in Home alone  -AV       Row Name 06/08/23 1027          Home Main Entrance    Number of Stairs, Main Entrance two;three  -AV       Row Name 06/08/23 1027          Cognition    Orientation Status (Cognition) --  alert, pleasant and cooperative. able to follow commands when desired. impaired insight and safety awareness.  -AV       Row Name 06/08/23 1027          Safety Issues, Functional Mobility    Impairments Affecting Function (Mobility) balance;cognition;endurance/activity tolerance;pain  -AV               User Key  (r) = Recorded By, (t) = Taken By, (c) = Cosigned By      Initials Name Provider Type    Pascual Tripathi OT Occupational Therapist                     Mobility/ADL's       Row Name 06/08/23 1032          Bed Mobility    Bed Mobility supine-sit-supine  -AV     Supine-Sit-Supine [place] (Bed Mobility) independent  -AV     Comment, (Bed Mobility) without difficulty  -AV       Row Name 06/08/23 1032          Transfers    Transfers sit-stand transfer;toilet transfer  -AV     Comment, (Transfers) further transfers declined with patient reporting inability to bear weight left foot (full planter/ dorsiflexion, circumduction at this time).  -AV       Row Name 06/08/23 1032          Sit-Stand Transfer    Sit-Stand [place] (Transfers) contact guard  -AV       Row Name 06/08/23 1032          Activities of Daily Living    BADL Assessment/Intervention --  Independent feeding and grooming with setup. Min-mod assist bathing and dressing. Mod assist toilet hygiene (BSC).  -AV               User Key  (r) = Recorded By, (t) = Taken By, (c) = Cosigned By      Initials Name Provider Type    Pascual Tripathi OT Occupational Therapist                   Obj/Interventions       Row Name 06/08/23 1035          Sensory Assessment (Somatosensory)    Sensory Assessment  "(Somatosensory) UE sensation intact  -AV       Row Name 06/08/23 1035          Vision Assessment/Intervention    Visual Impairment/Limitations WFL;corrective lenses full-time  -AV       Row Name 06/08/23 1035          Range of Motion Comprehensive    General Range of Motion bilateral upper extremity ROM WFL  -AV     Comment, General Range of Motion AROM including opposition  -AV       Row Name 06/08/23 1035          Strength Comprehensive (MMT)    Comment, General Manual Muscle Testing (MMT) Assessment 4+/5 bilateral upper extremities  -AV       Row Name 06/08/23 1035          Motor Skills    Motor Skills coordination;functional endurance  -AV     Coordination --  right dominant- patient reports impairment \"almost a year\"  -AV     Functional Endurance fair minus  -AV       Row Name 06/08/23 1035          Balance    Balance Assessment sitting dynamic balance;standing static balance  -AV     Dynamic Sitting Balance independent  -AV     Static Standing Balance contact guard;minimal assist  -AV               User Key  (r) = Recorded By, (t) = Taken By, (c) = Cosigned By      Initials Name Provider Type    AV Pascual Ramachandran, OT Occupational Therapist                   Goals/Plan       [facility] Name 06/08/23 1038          Transfer Goal 1 (OT)    Activity/Assistive Device (Transfer Goal 1, OT) transfers, all;walker, rolling  -AV     [place] Level/Cues Needed (Transfer Goal 1, OT) modified independence  -AV     Time Frame (Transfer Goal 1, OT) long term goal (LTG);10 days  -AV       Row Name 06/08/23 1038          Bathing Goal 1 (OT)    Activity/Device (Bathing Goal 1, OT) bathing skills, all;shower chair  -AV     [place] Level/Cues Needed (Bathing Goal 1, OT) modified independence  -AV     Time Frame (Bathing Goal 1, OT) long term goal (LTG);10 days  -AV       Row Name 06/08/23 1038          Dressing Goal 1 (OT)    Activity/Device (Dressing Goal 1, OT) dressing skills, all  -AV     [place]/Cues Needed (Dressing " Goal 1, OT) modified independence  -AV     Time Frame (Dressing Goal 1, OT) long term goal (LTG);10 days  -AV       Row Name 06/08/23 1038          Toileting Goal 1 (OT)    Activity/Device (Toileting Goal 1, OT) toileting skills, all;raised toilet seat  -AV     [place] Level/Cues Needed (Toileting Goal 1, OT) modified independence  -AV     Time Frame (Toileting Goal 1, OT) long term goal (LTG);10 days  -AV       Row Name 06/08/23 1038          Grooming Goal 1 (OT)    Activity/Device (Grooming Goal 1, OT) grooming skills, all  -AV     [place] (Grooming Goal 1, OT) modified independence  standing at sink  -AV     Time Frame (Grooming Goal 1, OT) long term goal (LTG);10 days  -AV       Row Name 06/08/23 1038          Problem Specific Goal 1 (OT)    Problem Specific Goal 1 (OT) Patient will demonstrate good endurance/ activity tolerance needed to support ADLs.  -AV     Time Frame (Problem Specific Goal 1, OT) long term goal (LTG);10 days  -AV       Row Name 06/08/23 1038          Therapy Assessment/Plan (OT)    Planned Therapy Interventions (OT) activity tolerance training;BADL retraining;IADL retraining;functional balance retraining;patient/caregiver education/training;transfer/mobility retraining  -AV               User Key  (r) = Recorded By, (t) = Taken By, (c) = Cosigned By      Initials Name Provider Type    Pascual Tripathi OT Occupational Therapist                   Clinical Impression       Row Name 06/08/23 1036          Pain Assessment    Additional Documentation Pain Scale: FACES Pre/Post-Treatment (Group)  -AV       Row Name 06/08/23 1036          Pain Scale: FACES Pre/Post-Treatment    Pain: FACES Scale, Pretreatment 2-->hurts little bit  -AV     Posttreatment Pain Rating 2-->hurts little bit  -AV       Row Name 06/08/23 1036          Plan of Care Review    Plan of Care Reviewed With patient  -AV     Progress no change  first session: evaluation  -AV     Outcome Evaluation Patient presents with  limitations of balance, endurance/ activity tolerance and cognition which are impacting ADL and transfer independence Skilled OT is indicated to remediate/compensate for deficits to maximize independence and safety with functional tasks.  -AV       Row Name 06/08/23 1036          Therapy Assessment/Plan (OT)    Patient/Family Therapy Goal Statement (OT) none stated  -AV     Rehab Potential (OT) fair, will monitor progress closely  -AV     Criteria for Skilled Therapeutic Interventions Met (OT) yes;meets criteria;skilled treatment is necessary  -AV     Therapy Frequency (OT) 5 times/wk  -AV       Row Name 06/08/23 1036          Therapy Plan Review/Discharge Plan (OT)    Equipment Needs Upon Discharge (OT) walker, rolling;commode chair  -AV     Anticipated Discharge Disposition (OT) sub acute care setting  return to [facility]  -AV       Row Name 06/08/23 1036          Vital Signs    O2 Delivery Pre Treatment room air  -AV     O2 Delivery Intra Treatment room air  -AV     O2 Delivery Post Treatment room air  -AV               User Key  (r) = Recorded By, (t) = Taken By, (c) = Cosigned By      Initials Name Provider Type    AV Pascual Ramachandran, JANA Occupational Therapist                   Outcome Measures       Row Name 06/08/23 1040          How much help from another is currently needed...    Putting on and taking off regular lower body clothing? 2  -AV     Bathing (including washing, rinsing, and drying) 2  -AV     Toileting (which includes using toilet bed pan or urinal) 2  -AV     Putting on and taking off regular upper body clothing 4  -AV     Taking care of personal grooming (such as brushing teeth) 4  -AV     Eating meals 4  -AV     AM-PAC 6 Clicks Score (OT) 18  -AV       Row Name 06/08/23 1040          Functional Assessment    Outcome Measure Options AM-PAC 6 Clicks Daily Activity (OT);Optimal Instrument  -AV       Row Name 06/08/23 1040          Optimal Instrument    Optimal Instrument Optimal - 3  -AV      Bending/Stooping 2  -AV     Standing 2  -AV     Reaching 1  -AV     From the list, choose the 3 activities you would most like to be able to do without any difficulty Bending/stooping;Standing;Reaching  -AV     Total Score Optimal - 3 5  -AV               User Key  (r) = Recorded By, (t) = Taken By, (c) = Cosigned By      Initials Name Provider Type    AV Pascual Ramachandran OT Occupational Therapist                    Occupational Therapy Education       Title: PT OT SLP Therapies (In Progress)       Topic: Occupational Therapy (In Progress)       Point: ADL training (Done)       Description:   Instruct learner(s) on proper safety adaptation and remediation techniques during self care or transfers.   Instruct in proper use of assistive devices.                  Learning Progress Summary             Patient Acceptance, E, VU by AV at 6/8/2023 1040                         Point: Home exercise program (Not Started)       Description:   Instruct learner(s) on appropriate technique for monitoring, assisting and/or progressing therapeutic exercises/activities.                  Learner Progress:  Not documented in this visit.              Point: Precautions (Not Started)       Description:   Instruct learner(s) on prescribed precautions during self-care and functional transfers.                  Learner Progress:  Not documented in this visit.              Point: Body mechanics (Not Started)       Description:   Instruct learner(s) on proper positioning and spine alignment during self-care, functional mobility activities and/or exercises.                  Learner Progress:  Not documented in this visit.                              User Key       Initials Effective Dates Name Provider Type Discipline     06/16/21 -  Pascual Ramachandran OT Occupational Therapist OT                  OT Recommendation and Plan  Planned Therapy Interventions (OT): activity tolerance training, BADL retraining, IADL retraining, functional balance  retraining, patient/caregiver education/training, transfer/mobility retraining  Therapy Frequency (OT): 5 times/wk  Plan of Care Review  Plan of Care Reviewed With: patient  Progress: no change (first session: evaluation)  Outcome Evaluation: Patient presents with limitations of balance, endurance/ activity tolerance and cognition which are impacting ADL and transfer independence Skilled OT is indicated to remediate/compensate for deficits to maximize independence and safety with functional tasks.     Time Calculation:    Time Calculation- OT       Row Name 06/08/23 1041             Time Calculation- OT    OT Received On 06/08/23  -AV      OT Goal Re-Cert Due Date 06/17/23  -AV         Untimed Charges    OT Eval/Re-eval Minutes 35  -AV         Total Minutes    Untimed Charges Total Minutes 35  -AV       Total Minutes 35  -AV                User Key  (r) = Recorded By, (t) = Taken By, (c) = Cosigned By      Initials Name Provider Type    AV Pascual Ramachandran OT Occupational Therapist                  Therapy Charges for Today       Code Description Service Date Service Provider Modifiers Qty    [id number] HC OT EVAL MOD COMPLEXITY 3 6/8/2023 Pascual Ramachandran OT GO 1                 Pascual Ramachandran OT  6/8/2023

## 2023-06-08 NOTE — PROGRESS NOTES
[facility]   Hospitalist Progress Note    Date of admission: 6/6/2023  Patient Name: Ashley Fried  1980  Date: 6/7/2023      Subjective     Chief Complaint   Patient presents with    Abdominal Pain    Leg Swelling    Swallowed Foreign Body       Summary: 43 y.o. [facility] with history of recurrent foreign body ingestion who presents after ingesting 4 pens.  GI consulted.  Patient underwent EGD on night of admission with successful removal.  Psychiatry consulted for additional assistance.    Interval Followup: Patient noting some left ankle pain and swelling denies any recent falling or trauma to this.  Has apparently been on some Lasix as outpatient for leg swelling.    Denies abdominal pain tolerating p.o.  Is anxious to [facility] at [facility].      Objective     Vitals:   Temp:  [97.8 °F (36.6 °C)-98.8 °F (37.1 °C)] 98.8 °F (37.1 °C)  Heart Rate:  [70-98] 75  Resp:  [16-18] 18  BP: (108-150)/(53-78) 118/57  Flow (L/min):  [2] 2    Physical Exam  Awake, conversant  RRR  CTA B  Abdomen soft nontender nondistended  AOx3 no SI or HI.  Slightly anxious at times but otherwise appropriate  Left ankle with moderate swelling, slight warmth to the touch, no obvious erysipelas or cellulitis or abscess, tenderness somewhat nonspecifically laterally and medially, some pain with plantarflexion      Result Review:  Vital signs, labs and recent relevant imaging reviewed.      buprenorphine-naloxone, 2 tablet, Sublingual, Daily  Diclofenac Sodium, 4 g, Topical, 4x Daily  [START ON 6/8/2023] ferrous sulfate, 325 mg, Oral, Daily With Breakfast  midazolam, 2 mg, Intravenous, Once  naproxen, 250 mg, Oral, BID With Meals  sodium chloride, 10 mL, Intravenous, Q12H  terazosin, 1 mg, Oral, Nightly  traZODone, 50 mg, Oral, Nightly  [START ON 6/8/2023] venlafaxine XR, 75 mg, Oral, Daily          aluminum-magnesium hydroxide-simethicone    [DISCONTINUED] senna-docusate sodium  **AND** [DISCONTINUED] polyethylene glycol **AND** bisacodyl **AND** bisacodyl    Diclofenac Sodium    hydrOXYzine    lactated ringers    lidocaine    melatonin    nicotine    ondansetron    polyethylene glycol    sodium chloride    sodium chloride      Assessment / Plan     Assessment/Plan:  Foreign body ingestion status post EGD on 6/6 with removal for him commands  Left ankle pain and swelling  Iron deficiency anemia, needs outpatient follow-up  Chronic pain on Suboxone  Alcohol abuse/dependence [facility]  Depression/anxiety      Status post EGD with successful removal, advancing diet as tolerated, appreciate assistance from GI  No overt SI HI, asked psychiatry to assist with evaluation, safety sitter until cleared from their perspective, appreciate assistance given this recurrent problem.  Left ankle x-ray for swelling, add scheduled naproxen, no ulcers noted on EGD, patient does need colonoscopy in the future, will place on iron as well.  Apparently as tolerated, before anaphylaxis listed will confirm but could also alternate with this for pain.  Voltaren topical scheduled to left ankle  OT consult for evaluation of left ankle may benefit from Unna boot to assist with ambulation as apparently has a lot of walking at [facility] and has been having difficulty getting around  Continue patient's Suboxone  Continue psychiatric medications further adjustments as per psychiatry  Continue hospital monitoring and treatment at current level of care - does not need upgraded at this time.    [facility] tomorrow to [facility] pending evaluation as above    DVT prophylaxis:  Mechanical DVT prophylaxis orders are present.    Level Of Support Discussed With: Patient  Code Status (Patient has no pulse and is not breathing): CPR (Attempt to Resuscitate)  Medical Interventions (Patient has pulse or is breathing): Full Support        CBC          6/1/2023    10:41 6/6/2023    14:25 6/7/2023     08:39 6/7/2023    17:08   CBC   WBC 6.36  5.49      RBC 3.78  3.69      Hemoglobin 11.9  11.4  12.1  11.6    Hematocrit 41.5  35.5  37.8  35.9    .8  96.2      MCH 31.5  30.9      MCHC 28.7  32.1      RDW 15.4  14.8      Platelets 268  242          CMP          12/4/2022    00:32 6/1/2023    10:41 6/6/2023    14:25   CMP   Glucose  90  131    Glucose 119         BUN 10     5  10    Creatinine 0.5     0.48  0.61    EGFR  120.0  113.2    Sodium 135     134  140    Potassium 4.4     4.8  4.0    Chloride 103     104  102    Calcium 9.7     8.7  8.9    Total Protein  6.6  6.9    Albumin  3.2  3.7    Globulin  3.4  3.2    Total Bilirubin  0.4  0.3    Alkaline Phosphatase  171  185    AST (SGOT)  28  17    ALT (SGPT)  22  12    Albumin/Globulin Ratio  0.9  1.2    BUN/Creatinine Ratio 20     10.4  16.4    Anion Gap 13     11.0  10.9       Details          This result is from an external source.             >50min spent on pt care coordination including multiple discussions with nursing, consultants, extended discussion with pt about plan of care and additional workup

## 2023-06-08 NOTE — DISCHARGE SUMMARY
[facility]         HOSPITALIST  DISCHARGE SUMMARY    Patient Name: Ashley Fried    : 1980    MRN: [id number]    Date of Admission: 2023  Date of Discharge:  23  Primary Care Physician: Provider, No Known    Consults       Date and Time Order Name Status Description    2023  8:34 AM Inpatient Psychiatrist Consult Completed     2023 10:25 PM Hospitalist (on-call MD unless specified)      2023 10:17 PM General MD Inpatient Consult              Final Diagnosis:  Foreign body ingestion status post EGD on  with removal for him commands  Left ankle pain and swelling  Iron deficiency anemia, needs outpatient follow-up  Chronic pain on Suboxone  Alcohol abuse/dependence currently on [facility]  Depression/anxiety    Hospital Course     Hospital Course:  43 y.o. female currently at [email or web address] for alcohol abuse with history of recurrent foreign body ingestion who presents after ingesting 4 pens.  GI consulted.  Patient underwent EGD on night of admission with successful removal.  Psychiatry consulted for additional assistance do not suspect any suicidal ideations and her gastric medications were adjusted and she is going to continue to follow-up with [facility] and psychiatry as outpatient.  Patient had some left ankle pain and swelling x-ray was negative for fracture and showed improvement with anti-inflammatories.      Patient discharged in stable condition back to [facility] with close PCP follow up.     Return precautions and follow up discussed and patient voiced agreement and understanding of treatment plan.     DISCHARGE Follow Up Recommendations for labs and diagnostics:   Follow-up with PCP for iron deficiency anemia needs age-appropriate screening.  Was started on iron.  No bleeding noted and no ulcers noted on EGD.    CODE STATUS:  Code Status and Medical Interventions:   Ordered at: 23 8090     Level Of Support Discussed With:     Patient     Code Status (Patient has no pulse and is not breathing):    CPR (Attempt to Resuscitate)     Medical Interventions (Patient has pulse or is breathing):    Full Support           Day of Discharge     Vital Signs:  Temp:  [97.3 °F (36.3 °C)-98.9 °F (37.2 °C)] 98.4 °F (36.9 °C)  Heart Rate:  [63-83] 63  Resp:  [18-20] 20  BP: (112-125)/(56-75) 122/69    Physical Exam  Gen: awake, resting in bed, conversant no acute distress CTA B abdomen soft AOx3 left ankle mild swelling appearing better than before not acutely tender to palpation      Discharge Details        Discharge Medications        New Medications        Instructions Start Date   acetaminophen 500 MG tablet  Commonly known as: TYLENOL   1,000 mg, Oral, Every 6 Hours PRN      Diclofenac Sodium 1 % gel gel  Commonly known as: VOLTAREN   4 g, Topical, 4 Times Daily      ferrous sulfate 325 (65 FE) MG tablet   325 mg, Oral, Daily With Breakfast   Start Date: June 9, 2023            Changes to Medications        Instructions Start Date   venlafaxine XR 75 MG 24 hr capsule  Commonly known as: EFFEXOR-XR  What changed:   medication strength  how much to take   75 mg, Oral, Daily   Start Date: June 9, 2023            Continue These Medications        Instructions Start Date   buprenorphine-naloxone 8-2 MG per SL tablet  Commonly known as: SUBOXONE   2 tablets, Sublingual, Daily      ibuprofen 400 MG tablet  Commonly known as: ADVIL,MOTRIN   400 mg, Oral, Every 8 Hours PRN      melatonin 5 MG sublingual tablet sublingual tablet   10 mg, Sublingual, Nightly      multivitamin with minerals tablet tablet   1 tablet, Oral, Daily      prazosin 1 MG capsule  Commonly known as: MINIPRESS   1 mg, Oral, Nightly      traZODone 50 MG tablet  Commonly known as: DESYREL   50 mg, Oral, Nightly             Stop These Medications      Lasix 40 MG tablet  Generic drug: furosemide     potassium chloride 20 MEQ CR tablet  Commonly known as: K-DUR,KLOR-CON                 Discharge Disposition:  Rehab Facility or Unit (DC - External)    Diet: patient counseled on dietary changes made during hospital and plans to  advance as tolerated     Discharge Activity: advance as tolerated      Additional Instructions for the Follow-ups that You Need to Schedule       Discharge Follow-up with PCP   As directed       Currently Documented PCP:    Provider, No Known    PCP Phone Number:    None     Follow Up Details: 5 days hospital f/u, establish care, and for anemia                 Pertinent  and/or Most Recent Results       LAB RESULTS:      Lab 06/08/23  0805 06/08/23  0001 06/07/23  1708 06/07/23  0839 06/06/23  1425   WBC  --  6.74  --   --  5.49   HEMOGLOBIN 11.5* 11.3* 11.6* 12.1 11.4*   HEMATOCRIT 35.9 35.2 35.9 37.8 35.5   PLATELETS  --  250  --   --  242   NEUTROS ABS  --  3.49  --   --  3.06   IMMATURE GRANS (ABS)  --  0.01  --   --  0.02   LYMPHS ABS  --  2.35  --   --  1.50   MONOS ABS  --  0.81  --   --  0.70   EOS ABS  --  0.06  --   --  0.19   MCV  --  95.7  --   --  96.2         Lab 06/08/23  0002 06/06/23  1425   SODIUM 137 140   POTASSIUM 4.2 4.0   CHLORIDE 103 102   CO2 25.1 27.1   ANION GAP 8.9 10.9   BUN 13 10   CREATININE 0.63 0.61   EGFR 112.3 113.2   GLUCOSE 110* 131*   CALCIUM 8.9 8.9   MAGNESIUM 2.1  --    PHOSPHORUS 3.3  --          Lab 06/08/23  0002 06/06/23  1425   TOTAL PROTEIN 6.9 6.9   ALBUMIN 3.6 3.7   GLOBULIN  --  3.2   ALT (SGPT) 12 12   AST (SGOT) 16 17   BILIRUBIN 0.3 0.3   BILIRUBIN DIRECT <0.2  --    ALK PHOS 202* 185*   LIPASE  --  11*         Lab 06/06/23  1425   PROBNP 119.5   HSTROP T <6             Lab 06/07/23  0839   IRON 35*   IRON SATURATION (TSAT) 7*   TIBC 481   TRANSFERRIN 323   FERRITIN 32.68   FOLATE >20.00   VITAMIN B 12 613         Brief Urine Lab Results  (Last result in the past 365 days)        Color   Clarity   Blood   Leuk Est   Nitrite   Protein   CREAT   Urine HCG        06/06/23 1526               Negative       06/06/23  1526 Dark Yellow   Cloudy   Negative   Trace   Negative   Trace                 Microbiology Results (last 10 days)       ** No results found for the last 240 hours. **            RADIOLOGY:    CT Abdomen Pelvis Without Contrast    Result Date: 6/6/2023  Impression:   1. Four radiopaque foreign bodies within stomach. 2. Nonobstructive bowel gas pattern.     STEPH LINARES MD       Electronically Signed and Approved By: STEPH LINARES MD on 6/06/2023 at 21:38             XR Hand 2 View Left    Result Date: 5/14/2023  Impression: No acute bony abnormality of the hand. - Note: Radiology results need to be interpreted within a comprehensive clinical context.  If you have questions about the radiology report, please contact the office of the ordering clinician.    XR Foot 2 View Left    Result Date: 6/7/2023  Impression:  No acute osseous process identified.      STEPH LINARES MD       Electronically Signed and Approved By: STEPH LINARES MD on 6/07/2023 at 21:33             XR Foot 3+ View Right    Result Date: 6/7/2023  Impression:   1. Mild midfoot osteoarthritis      Wyatt Rios M.D.       Electronically Signed and Approved By: Wyatt Rios M.D. on 6/07/2023 at 16:44             XR Abdomen 2+ VW with Chest 1 VW    Addendum Date: 6/6/2023    ADDENDUM:  The initial radiographs were interpreted as a chest radiograph.  The views of the abdomen were not reviewed.  There are 4 small metallic densities projected over the left upper quadrant which may be within the bowel.  There is a moderate amount of fecal residue in the colon.  No small bowel dilatation is present.    Sukhdev Luciano MD       Electronically Signed and Approved By: Sukhdev Luciano MD on 6/06/2023 at 18:10             Result Date: 6/6/2023  Impression:   1. No active disease 2. No significant radiopaque foreign bodies are identified     Sukhdev Luciano MD       Electronically Signed and Approved By: Sukhdev Luciano MD on 6/06/2023 at 16:56              CT Head Without Contrast    Result Date: 5/14/2023  Impression: Limited but grossly negative examination secondary to motion artifact. - Note: Radiology results need to be interpreted within a comprehensive clinical context.  If you have questions about the radiology report, please contact the office of the ordering clinician.    MRI Brain Without Contrast    Result Date: 5/19/2023  Impression: No acute abnormality. Perforated nasal septum could be related to chronic granulomatous process. Please correlate clinically. - Note: Radiology results need to be interpreted within a comprehensive clinical context.  If you have questions about the radiology report, please contact the office of the ordering clinician.    XR Chest 1 View    Result Date: 5/20/2023  Impression: No acute finding. - Note: Radiology results need to be interpreted within a comprehensive clinical context.  If you have questions about the radiology report, please contact the office of the ordering clinician.    XR Chest 1 View    Result Date: 5/19/2023  Impression: No acute finding. - Note: Radiology results need to be interpreted within a comprehensive clinical context.  If you have questions about the radiology report, please contact the office of the ordering clinician.    XR Chest 1 View    Result Date: 5/15/2023  Impression: 1.  Incompletely visualized metallic densities projecting over the left upper quadrant, likely related to ingested batteries given the provided history. 2.  Clear lungs. - Note: Radiology results need to be interpreted within a comprehensive clinical context.  If you have questions about the radiology report, please contact the office of the ordering clinician.    XR Abdomen KUB    Result Date: 5/21/2023  Impression: Unchanged position of the foreign body compatible with a paper clip - Note: Radiology results need to be interpreted within a comprehensive clinical context.  If you have questions about the radiology report,  please contact the office of the ordering clinician.    XR Abdomen KUB    Result Date: 5/20/2023  Impression: Paper clip projects over the stomach, partially uncoiled. - Note: Radiology results need to be interpreted within a comprehensive clinical context.  If you have questions about the radiology report, please contact the office of the ordering clinician.    XR Abdomen KUB    Result Date: 5/19/2023  Impression: Several metallic foreign bodies projecting in the upper abdomen. - Note: Radiology results need to be interpreted within a comprehensive clinical context.  If you have questions about the radiology report, please contact the office of the ordering clinician.    XR Abdomen KUB    Result Date: 5/15/2023  Impression: Metallic radiodensities projecting over the left upper quadrant in the region of the stomach, compatible with the provided history of two ingested batteries. - Note: Radiology results need to be interpreted within a comprehensive clinical context.  If you have questions about the radiology report, please contact the office of the ordering clinician.    XR Abdomen KUB    Result Date: 5/14/2023  Impression: 2 metallic foreign bodies compatible with nail clippers and ballpoint pen in the GE junction/distal esophagus. - Note: Radiology results need to be interpreted within a comprehensive clinical context.  If you have questions about the radiology report, please contact the office of the ordering clinician.    XR Chest PA & Lateral    Result Date: 6/7/2023  Impression:  No acute disease.  No significant change has occurred.    BERE FOSTER MD       Electronically Signed and Approved By: BERE FOSTER MD on 6/07/2023 at 10:28             XR Chest PA & Lateral    Result Date: 5/14/2023  Impression: Swallowed foreign body compatible with nail clippers in the distal esophagus - Note: Radiology results need to be interpreted within a comprehensive clinical context.  If you have questions about the  radiology report, please contact the office of the ordering clinician.    CT FACIAL BONES WO CONTRAST    Result Date: 2023  Impression: Limited by motion with repeat imaging attempted. No definite fracture - Note: Radiology results need to be interpreted within a comprehensive clinical context.  If you have questions about the radiology report, please contact the office of the ordering clinician.    EK EKG 12 LEAD    Result Date: 2023  Impression:                             [facility]                                                                               Test Date:    2023 Pat Name:     [facility]            Department:   [facility] Patient ID:   [id number]                 Room:         Gender:       Female                   Technician:   Artur :          1980               Requested By: [facility] EMERGENCY Order Number: [id number]                Reading MD:   Rosendo Cormier MD                                  Measurements Intervals                              Axis           Rate:         125                      P:            39 MS:           144                      QRS:          -18 QRSD:         89                       T:            -3 QT:           380                                     QTc:          549                                                                Interpretive Statements SINUS TACHYCARDIA NONSPECIFIC ST & T-WAVE ABNORMALITY ABNORMAL RHYTHM ECG Electronically Signed On 2023 12:47:58 EDT by Rosendo Cormier MD     Results for orders placed during the hospital encounter of 23    Duplex Venous Lower Extremity - Left CAR    Interpretation Summary    Normal left lower extremity venous duplex scan.      Results for orders placed during the hospital encounter of 23    Duplex Venous Lower Extremity - Left CAR    Interpretation Summary    Normal left lower extremity venous duplex scan.          Labs Pending at Discharge:        Time  spent on Discharge including face to face service:  >33 minutes

## 2023-06-08 NOTE — PLAN OF CARE
Goal Outcome Evaluation:  Plan of Care Reviewed With: patient           Outcome Evaluation: PT is AAOx4, VSS. Remains anxious at times. No acute events this shift. Compliant with tx plan this shift. Will DC back to [facility]. Transportation provided by [facility].

## 2023-07-04 PROBLEM — J96.01 ACUTE RESPIRATORY FAILURE WITH HYPOXIA: Status: ACTIVE | Noted: 2023-07-04

## 2023-08-07 ENCOUNTER — HOSPITAL ENCOUNTER (EMERGENCY)
Facility: HOSPITAL | Age: 43
Discharge: HOME | End: 2023-08-07
Attending: EMERGENCY MEDICINE | Admitting: EMERGENCY MEDICINE
Payer: COMMERCIAL

## 2023-08-07 ENCOUNTER — TELEPHONE (OUTPATIENT)
Dept: GASTROENTEROLOGY | Facility: CLINIC | Age: 43
End: 2023-08-07
Payer: COMMERCIAL

## 2023-08-07 ENCOUNTER — APPOINTMENT (OUTPATIENT)
Dept: CT IMAGING | Facility: HOSPITAL | Age: 43
End: 2023-08-07
Payer: COMMERCIAL

## 2023-08-07 VITALS
HEIGHT: 66 IN | HEART RATE: 97 BPM | DIASTOLIC BLOOD PRESSURE: 84 MMHG | TEMPERATURE: 97.8 F | BODY MASS INDEX: 31.47 KG/M2 | WEIGHT: 195.8 LBS | OXYGEN SATURATION: 96 % | SYSTOLIC BLOOD PRESSURE: 146 MMHG | RESPIRATION RATE: 18 BRPM

## 2023-08-07 DIAGNOSIS — T18.2XXA FOREIGN BODY IN STOMACH, INITIAL ENCOUNTER: Primary | ICD-10-CM

## 2023-08-07 DIAGNOSIS — K43.9 VENTRAL HERNIA WITHOUT OBSTRUCTION OR GANGRENE: ICD-10-CM

## 2023-08-07 LAB
ALBUMIN SERPL-MCNC: 4.8 G/DL (ref 3.5–5.2)
ALBUMIN/GLOB SERPL: 1.5 G/DL
ALP SERPL-CCNC: 217 U/L (ref 39–117)
ALT SERPL W P-5'-P-CCNC: 20 U/L (ref 1–33)
ANION GAP SERPL CALCULATED.3IONS-SCNC: 11.2 MMOL/L (ref 5–15)
AST SERPL-CCNC: 26 U/L (ref 1–32)
BASOPHILS # BLD AUTO: 0.05 10*3/MM3 (ref 0–0.2)
BASOPHILS NFR BLD AUTO: 0.7 % (ref 0–1.5)
BILIRUB SERPL-MCNC: 0.8 MG/DL (ref 0–1.2)
BILIRUB UR QL STRIP: NEGATIVE
BUN SERPL-MCNC: 9 MG/DL (ref 6–20)
BUN/CREAT SERPL: 15.3 (ref 7–25)
CALCIUM SPEC-SCNC: 10 MG/DL (ref 8.6–10.5)
CHLORIDE SERPL-SCNC: 100 MMOL/L (ref 98–107)
CLARITY UR: CLEAR
CO2 SERPL-SCNC: 26.8 MMOL/L (ref 22–29)
COLOR UR: YELLOW
CREAT SERPL-MCNC: 0.59 MG/DL (ref 0.57–1)
DEPRECATED RDW RBC AUTO: 45.1 FL (ref 37–54)
EGFRCR SERPLBLD CKD-EPI 2021: 114.1 ML/MIN/1.73
EOSINOPHIL # BLD AUTO: 0.15 10*3/MM3 (ref 0–0.4)
EOSINOPHIL NFR BLD AUTO: 2.1 % (ref 0.3–6.2)
ERYTHROCYTE [DISTWIDTH] IN BLOOD BY AUTOMATED COUNT: 14 % (ref 12.3–15.4)
GLOBULIN UR ELPH-MCNC: 3.1 GM/DL
GLUCOSE SERPL-MCNC: 102 MG/DL (ref 65–99)
GLUCOSE UR STRIP-MCNC: NEGATIVE MG/DL
HCT VFR BLD AUTO: 43.2 % (ref 34–46.6)
HGB BLD-MCNC: 14.7 G/DL (ref 12–15.9)
HGB UR QL STRIP.AUTO: NEGATIVE
HOLD SPECIMEN: NORMAL
HOLD SPECIMEN: NORMAL
IMM GRANULOCYTES # BLD AUTO: 0.03 10*3/MM3 (ref 0–0.05)
IMM GRANULOCYTES NFR BLD AUTO: 0.4 % (ref 0–0.5)
KETONES UR QL STRIP: ABNORMAL
LEUKOCYTE ESTERASE UR QL STRIP.AUTO: NEGATIVE
LIPASE SERPL-CCNC: 16 U/L (ref 13–60)
LYMPHOCYTES # BLD AUTO: 2.18 10*3/MM3 (ref 0.7–3.1)
LYMPHOCYTES NFR BLD AUTO: 30.2 % (ref 19.6–45.3)
MCH RBC QN AUTO: 29.8 PG (ref 26.6–33)
MCHC RBC AUTO-ENTMCNC: 34 G/DL (ref 31.5–35.7)
MCV RBC AUTO: 87.6 FL (ref 79–97)
MONOCYTES # BLD AUTO: 0.54 10*3/MM3 (ref 0.1–0.9)
MONOCYTES NFR BLD AUTO: 7.5 % (ref 5–12)
NEUTROPHILS NFR BLD AUTO: 4.28 10*3/MM3 (ref 1.7–7)
NEUTROPHILS NFR BLD AUTO: 59.1 % (ref 42.7–76)
NITRITE UR QL STRIP: NEGATIVE
NRBC BLD AUTO-RTO: 0 /100 WBC (ref 0–0.2)
PH UR STRIP.AUTO: 6.5 [PH] (ref 5–8)
PLATELET # BLD AUTO: 214 10*3/MM3 (ref 140–450)
PMV BLD AUTO: 9.1 FL (ref 6–12)
POTASSIUM SERPL-SCNC: 3.7 MMOL/L (ref 3.5–5.2)
PROT SERPL-MCNC: 7.9 G/DL (ref 6–8.5)
PROT UR QL STRIP: NEGATIVE
RBC # BLD AUTO: 4.93 10*6/MM3 (ref 3.77–5.28)
SODIUM SERPL-SCNC: 138 MMOL/L (ref 136–145)
SP GR UR STRIP: 1.02 (ref 1–1.03)
UROBILINOGEN UR QL STRIP: ABNORMAL
WBC NRBC COR # BLD: 7.23 10*3/MM3 (ref 3.4–10.8)
WHOLE BLOOD HOLD COAG: NORMAL
WHOLE BLOOD HOLD SPECIMEN: NORMAL

## 2023-08-07 PROCEDURE — 25510000001 IOPAMIDOL 61 % SOLUTION: Performed by: EMERGENCY MEDICINE

## 2023-08-07 PROCEDURE — 81003 URINALYSIS AUTO W/O SCOPE: CPT | Performed by: EMERGENCY MEDICINE

## 2023-08-07 PROCEDURE — 25010000002 MORPHINE PER 10 MG: Performed by: EMERGENCY MEDICINE

## 2023-08-07 PROCEDURE — 25010000002 ONDANSETRON PER 1 MG

## 2023-08-07 PROCEDURE — 96374 THER/PROPH/DIAG INJ IV PUSH: CPT

## 2023-08-07 PROCEDURE — 96375 TX/PRO/DX INJ NEW DRUG ADDON: CPT

## 2023-08-07 PROCEDURE — 85025 COMPLETE CBC W/AUTO DIFF WBC: CPT | Performed by: EMERGENCY MEDICINE

## 2023-08-07 PROCEDURE — 99285 EMERGENCY DEPT VISIT HI MDM: CPT

## 2023-08-07 PROCEDURE — 25010000002 KETOROLAC TROMETHAMINE PER 15 MG

## 2023-08-07 PROCEDURE — 80053 COMPREHEN METABOLIC PANEL: CPT | Performed by: EMERGENCY MEDICINE

## 2023-08-07 PROCEDURE — 74177 CT ABD & PELVIS W/CONTRAST: CPT

## 2023-08-07 PROCEDURE — 83690 ASSAY OF LIPASE: CPT | Performed by: EMERGENCY MEDICINE

## 2023-08-07 PROCEDURE — 96376 TX/PRO/DX INJ SAME DRUG ADON: CPT

## 2023-08-07 PROCEDURE — 25010000002 DIPHENHYDRAMINE PER 50 MG

## 2023-08-07 RX ORDER — MORPHINE SULFATE 2 MG/ML
2 INJECTION, SOLUTION INTRAMUSCULAR; INTRAVENOUS ONCE
Status: COMPLETED | OUTPATIENT
Start: 2023-08-07 | End: 2023-08-07

## 2023-08-07 RX ORDER — KETOROLAC TROMETHAMINE 30 MG/ML
15 INJECTION, SOLUTION INTRAMUSCULAR; INTRAVENOUS ONCE
Status: COMPLETED | OUTPATIENT
Start: 2023-08-07 | End: 2023-08-07

## 2023-08-07 RX ORDER — DIPHENHYDRAMINE HYDROCHLORIDE 50 MG/ML
25 INJECTION INTRAMUSCULAR; INTRAVENOUS ONCE
Status: COMPLETED | OUTPATIENT
Start: 2023-08-07 | End: 2023-08-07

## 2023-08-07 RX ORDER — ONDANSETRON 2 MG/ML
4 INJECTION INTRAMUSCULAR; INTRAVENOUS ONCE
Status: COMPLETED | OUTPATIENT
Start: 2023-08-07 | End: 2023-08-07

## 2023-08-07 RX ORDER — SODIUM CHLORIDE 0.9 % (FLUSH) 0.9 %
10 SYRINGE (ML) INJECTION AS NEEDED
Status: DISCONTINUED | OUTPATIENT
Start: 2023-08-07 | End: 2023-08-07 | Stop reason: HOSPADM

## 2023-08-07 RX ORDER — POLYETHYLENE GLYCOL 3350 17 G/17G
17 POWDER, FOR SOLUTION ORAL DAILY
Qty: 238 G | Refills: 0 | Status: SHIPPED | OUTPATIENT
Start: 2023-08-07 | End: 2023-08-21

## 2023-08-07 RX ADMIN — ONDANSETRON 4 MG: 2 INJECTION INTRAMUSCULAR; INTRAVENOUS at 13:14

## 2023-08-07 RX ADMIN — DIPHENHYDRAMINE HYDROCHLORIDE 25 MG: 50 INJECTION INTRAMUSCULAR; INTRAVENOUS at 16:08

## 2023-08-07 RX ADMIN — KETOROLAC TROMETHAMINE 15 MG: 30 INJECTION, SOLUTION INTRAMUSCULAR; INTRAVENOUS at 15:20

## 2023-08-07 RX ADMIN — SODIUM CHLORIDE 1000 ML: 9 INJECTION, SOLUTION INTRAVENOUS at 13:14

## 2023-08-07 RX ADMIN — IOPAMIDOL 100 ML: 612 INJECTION, SOLUTION INTRAVENOUS at 13:37

## 2023-08-07 RX ADMIN — ONDANSETRON 4 MG: 2 INJECTION INTRAMUSCULAR; INTRAVENOUS at 15:20

## 2023-08-07 RX ADMIN — MORPHINE SULFATE 2 MG: 2 INJECTION, SOLUTION INTRAMUSCULAR; INTRAVENOUS at 13:14

## 2023-08-07 NOTE — TELEPHONE ENCOUNTER
Pt was referred to GI office from the [facility] ED today. They arranged GI op appt 8/9. There is no inpatient GI service available this week at this facility. Per ED note- she had reported swallowing the inside of ink pens back in May 2023. Per record though, she had EGD at [facility] and had removal of 4 pens at that time. Reviewed CT scan findings/images. Has 2 foreign bodies in stomach and 1 in small bowel just before the small bowel anastamosis. Discussed with Dr. Guy via phone.     She needs to report back to ED with any severe symptoms. Monitor for any signs of obstruction, severe nausea, vomiting, abdominal distension, fever, etc. We can tentatively book her for EGD on Monday 8/14 (asap), plan for OV 8/9, repeat CTAP to check progress on Fri (8/11). If pen close to colon, may be able to perform both EGD and colonoscopy for removal of all 3 foreign bodies.     I called pt. She is having severe abdominal pain now with maroon colored stool, just had a BM a couple of minutes ago. She is very worried and is not able to stand or perform her normal activities. I directed her to report to [facility] where they will have GI on call.

## 2023-08-07 NOTE — ED PROVIDER NOTES
Subjective:  History of Present Illness:    Patient is a 43-year-old female here today with abdominal pain.  She states that she has been experiencing constipation for approximately 6 weeks.  Over the past week she has started eating Activia and was able to have a bowel movement this morning.  Then approximately 1 hour prior to arrival she had another bowel movement but only passed blood.  She denies vomiting, diarrhea, fever.  She does have some nausea.  She is having generalized abdominal pain and reports having a hernia.  Patient reports a significant medical history of alcoholism and pica.  She states that she would eat things in order to deter her from drinking.  She last had an prior to her sobriety that started May 19.  As far she is aware she has not passed the pen.      Nurses Notes reviewed and agree, including vitals, allergies, social history and prior medical history.     REVIEW OF SYSTEMS: All systems reviewed and not pertinent unless noted.  Review of Systems    Past Medical History:   Diagnosis Date    Cancer     OVARIAN AND BLADDER       Allergies:    Bee venom, Menadiol sodium diphosphate, Penicillins, and Chlordiazepoxide      Past Surgical History:   Procedure Laterality Date    BREAST SURGERY      ENDOSCOPY N/A 6/6/2023    Procedure: ESOPHAGOGASTRODUODENOSCOPY WITH FOREIGN BODY REMOVAL;  Surgeon: Jarrod Corrigan MD;  Location: [facility] OR;  Service: Gastroenterology;  Laterality: N/A;  FOUR INK PEN CENTERS (STRAWS)    HYSTERECTOMY           Social History     Socioeconomic History    Marital status:    Tobacco Use    Smoking status: Every Day     Packs/day: 1.00     Years: 25.00     Pack years: 25.00     Types: Cigarettes    Smokeless tobacco: Never   Vaping Use    Vaping Use: Never used   Substance and Sexual Activity    Alcohol use: Not Currently     Comment: Soder since 5-19-23    Drug use: Not Currently    Sexual activity: Defer         History reviewed. No pertinent family  "history.    Objective  Physical Exam:  /98   Pulse 97   Temp 97.8 øF (36.6 øC)   Resp 18   Ht 167.6 cm (66\")   Wt 88.8 kg (195 lb 12.8 oz)   SpO2 97%   BMI 31.60 kg/mý      Physical Exam  Vitals and nursing note reviewed.   Constitutional:       Appearance: Normal appearance. She is normal weight.   HENT:      Head: Normocephalic and atraumatic.   Cardiovascular:      Rate and Rhythm: Normal rate and regular rhythm.      Pulses: Normal pulses.      Heart sounds: Normal heart sounds.   Pulmonary:      Effort: Pulmonary effort is normal.      Breath sounds: Normal breath sounds.   Abdominal:      General: Abdomen is flat. Bowel sounds are normal. There is no distension.      Palpations: Abdomen is soft.      Tenderness: There is generalized no abdominal tenderness.      Hernia: A hernia is present. Hernia is present in the ventral area.   Musculoskeletal:      Right lower leg: No edema.      Left lower leg: No edema.   Skin:     General: Skin is warm and dry.   Neurological:      General: No focal deficit present.      Mental Status: She is alert and oriented to person, place, and time.   Psychiatric:         Mood and Affect: Mood normal. Mood is anxious.         Behavior: Behavior normal.       Procedures    ED Course:         Lab Results (last 24 hours)       Procedure Component Value Units Date/Time    CBC & Differential [[id number]]  (Normal) Collected: 08/07/23 1308    Specimen: Blood Updated: 08/07/23 1316    Narrative:      The following orders were created for panel order CBC & Differential.  Procedure                               Abnormality         Status                     ---------                               -----------         ------                     CBC Auto Differential[[id number]]        Normal              Final result                 Please view results for these tests on the individual orders.    Comprehensive Metabolic Panel [[id number]]  (Abnormal) Collected: 08/07/23 1308    " Specimen: Blood Updated: 08/07/23 1335     Glucose 102 mg/dL      BUN 9 mg/dL      Creatinine 0.59 mg/dL      Sodium 138 mmol/L      Potassium 3.7 mmol/L      Chloride 100 mmol/L      CO2 26.8 mmol/L      Calcium 10.0 mg/dL      Total Protein 7.9 g/dL      Albumin 4.8 g/dL      ALT (SGPT) 20 U/L      AST (SGOT) 26 U/L      Alkaline Phosphatase 217 U/L      Total Bilirubin 0.8 mg/dL      Globulin 3.1 gm/dL      A/G Ratio 1.5 g/dL      BUN/Creatinine Ratio 15.3     Anion Gap 11.2 mmol/L      eGFR 114.1 mL/min/1.73     Narrative:      GFR Normal >60  Chronic Kidney Disease <60  Kidney Failure <15      Lipase [[id number]]  (Normal) Collected: 08/07/23 1308    Specimen: Blood Updated: 08/07/23 1335     Lipase 16 U/L     CBC Auto Differential [[id number]]  (Normal) Collected: 08/07/23 1308    Specimen: Blood Updated: 08/07/23 1316     WBC 7.23 10*3/mm3      RBC 4.93 10*6/mm3      Hemoglobin 14.7 g/dL      Hematocrit 43.2 %      MCV 87.6 fL      MCH 29.8 pg      MCHC 34.0 g/dL      RDW 14.0 %      RDW-SD 45.1 fl      MPV 9.1 fL      Platelets 214 10*3/mm3      Neutrophil % 59.1 %      Lymphocyte % 30.2 %      Monocyte % 7.5 %      Eosinophil % 2.1 %      Basophil % 0.7 %      Immature Grans % 0.4 %      Neutrophils, Absolute 4.28 10*3/mm3      Lymphocytes, Absolute 2.18 10*3/mm3      Monocytes, Absolute 0.54 10*3/mm3      Eosinophils, Absolute 0.15 10*3/mm3      Basophils, Absolute 0.05 10*3/mm3      Immature Grans, Absolute 0.03 10*3/mm3      nRBC 0.0 /100 WBC     Urinalysis With Microscopic If Indicated (No Culture) - Urine, Clean Catch [[id number]]  (Abnormal) Collected: 08/07/23 1309    Specimen: Urine, Clean Catch Updated: 08/07/23 1322     Color, UA Yellow     Appearance, UA Clear     pH, UA 6.5     Specific Gravity, UA 1.023     Glucose, UA Negative     Ketones, UA Trace     Bilirubin, UA Negative     Blood, UA Negative     Protein, UA Negative     Leuk Esterase, UA Negative     Nitrite, UA Negative     Urobilinogen,  UA 1.0 E.U./dL    Narrative:      Urine microscopic not indicated.             CT Abdomen Pelvis With Contrast    Result Date: 8/7/2023  PROCEDURE: CT ABDOMEN PELVIS W CONTRAST-  HISTORY:  Rectal bleeding, possible foreign body  COMPARISON:  None .  TECHNIQUE: Multiple axial CT images were obtained from the lung bases through the pubic symphysis following the administration of Isovue 300 intravenous contrast.  FINDINGS:  ABDOMEN: The lung bases are clear. The heart is normal in size. The liver is normal. The spleen is unremarkable. Air is noted within the biliary system suspect related to postcholecystectomy state. No adrenal mass is present.  The pancreas is normal. The kidneys are normal. The aorta is normal in caliber. There is no free fluid or adenopathy. There is a tubular air-filled structure within the fundus and body of the stomach measuring 12 cm in length with a metallic tip. There is an adjacent 3 cm structure. These findings would be consistent with a components of a pen. Within the small bowel there is a tubular air-filled structure extending 9 cm with the distal tip at an anastomosis, suspected to represent a end to side anastomosis. It is unclear if this structure will pass given its size. There is a tiny abdominal wall hernia within the midline of the upper abdomen. There is diastases just to the right of midline extending 14 cm in distance containing nonobstructed bowel.  PELVIS: The appendix is not identified.  The urinary bladder is mostly collapsed. Post hysterectomy. There is no significant free fluid or adenopathy.      Impression: 3 foreign bodies, 2 within the stomach and 1 within the ileum just proximal to an anastomosis. No evidence of obstruction at this time.    This study was performed with techniques to keep radiation doses as low as reasonably achievable (ALARA). Individualized dose reduction techniques using automated exposure control or adjustment of mA and/or kV according to the  patient size were employed.     Images were reviewed, interpreted, and dictated by Dr. Kaylee Sun MD Transcribed by Pipe Salter PA-C.  This report was signed and finalized on 8/7/2023 2:52 PM by Kaylee Sun MD.          MDM     Amount and/or Complexity of Data Reviewed  Clinical lab tests: reviewed  Tests in the radiology section of [facility]: reviewed        Initial impression of presenting illness: Patient is a 43-year-old female here today with abdominal pain and rectal bleeding.  She does not appear to be in acute distress    DDX: includes but is not limited to: Hemorrhoids, anal fissure, upper GI bleed, incarcerated hernia, among others    Patient arrives medically stable with vitals interpreted by myself.     Pertinent features from physical exam: Diffuse abdominal pain presence of ventral hernia.    Initial diagnostic plan: CBC, CMP, lipase, UA, and CT abdomen pelvis    Results from initial plan were reviewed and interpreted by me revealing labs and urine nonactionable.  CT scan shows 3 foreign bodies, 2 are within the stomach and one is within the ileum does make note of a small hernia, but is not obstructed.    Diagnostic information from other sources: Medical record    Interventions / Re-evaluation: Patient was initially admitted with Zofran and morphine.  She noted improvement in her symptoms with these medications request.  She was given a dose of Toradol and Zofran.    Results/clinical rationale were discussed with patient and Dr. Santana who recommended discussing the CT results with the on-call surgeon.    Consultations/Discussion of results with other physicians: Spoke with Dr. Celaya at 1510 in regards to the patient's CT results.  Recommended having patient follow-up with GI for EGD stomach.  As for the foreign body within the ileum, this can continue to be monitored.  Did recommend providing the patient with a stool softener to help with underlying constipation.    Disposition plan: Patient  discharged home in stable condition.  An appointment was facilitated by the monitor tech for Wednesday, August 9 at 2 PM.  -----        Final diagnoses:   Foreign body in stomach, initial encounter   Ventral hernia without obstruction or gangrene          Rick Devine, APRN  08/07/23 1609

## 2023-08-07 NOTE — DISCHARGE INSTRUCTIONS
You have 3 foreign bodies in your GI system. 2 are in the stomach consistent with the shape of an ink pen. The other foreign body is in the intestines. Recommend following-up with a GI specialist to have an EGD to remove the 2 from the stomach. The 1 in the intestines should be monitored. You have an appointment on Wednesday, 8/9 at 2:00 PM. It is safe to use the Miralax to help with constipation, suspect the rectal bleeding today is due to internal hemorrhoids. Return to the ER for any new or worsening symptoms, such as intractable pain/nausea/vomiting, distended/hard abdomen, skin discoloration to the hernia, or any other concerning symptom.

## 2023-08-09 ENCOUNTER — APPOINTMENT (OUTPATIENT)
Dept: CT IMAGING | Facility: HOSPITAL | Age: 43
End: 2023-08-09
Payer: COMMERCIAL

## 2023-08-09 ENCOUNTER — HOSPITAL ENCOUNTER (EMERGENCY)
Facility: HOSPITAL | Age: 43
Discharge: HOME | End: 2023-08-09
Attending: EMERGENCY MEDICINE | Admitting: EMERGENCY MEDICINE
Payer: COMMERCIAL

## 2023-08-09 VITALS
SYSTOLIC BLOOD PRESSURE: 147 MMHG | HEIGHT: 66 IN | HEART RATE: 53 BPM | OXYGEN SATURATION: 96 % | WEIGHT: 195 LBS | RESPIRATION RATE: 16 BRPM | DIASTOLIC BLOOD PRESSURE: 81 MMHG | BODY MASS INDEX: 31.34 KG/M2 | TEMPERATURE: 98.4 F

## 2023-08-09 DIAGNOSIS — T18.9XXA SWALLOWED FOREIGN BODY, INITIAL ENCOUNTER: Primary | ICD-10-CM

## 2023-08-09 LAB
ALBUMIN SERPL-MCNC: 4.7 G/DL (ref 3.5–5.2)
ALBUMIN/GLOB SERPL: 1.7 G/DL
ALP SERPL-CCNC: 193 U/L (ref 39–117)
ALT SERPL W P-5'-P-CCNC: 21 U/L (ref 1–33)
ANION GAP SERPL CALCULATED.3IONS-SCNC: 11.4 MMOL/L (ref 5–15)
AST SERPL-CCNC: 26 U/L (ref 1–32)
BASOPHILS # BLD AUTO: 0.04 10*3/MM3 (ref 0–0.2)
BASOPHILS NFR BLD AUTO: 0.6 % (ref 0–1.5)
BILIRUB SERPL-MCNC: 0.7 MG/DL (ref 0–1.2)
BILIRUB UR QL STRIP: NEGATIVE
BUN SERPL-MCNC: 9 MG/DL (ref 6–20)
BUN/CREAT SERPL: 16.4 (ref 7–25)
CALCIUM SPEC-SCNC: 9.4 MG/DL (ref 8.6–10.5)
CHLORIDE SERPL-SCNC: 102 MMOL/L (ref 98–107)
CLARITY UR: CLEAR
CO2 SERPL-SCNC: 23.6 MMOL/L (ref 22–29)
COLOR UR: YELLOW
CREAT SERPL-MCNC: 0.55 MG/DL (ref 0.57–1)
D-LACTATE SERPL-SCNC: 1.1 MMOL/L (ref 0.5–2)
DEPRECATED RDW RBC AUTO: 44.7 FL (ref 37–54)
EGFRCR SERPLBLD CKD-EPI 2021: 116.1 ML/MIN/1.73
EOSINOPHIL # BLD AUTO: 0.11 10*3/MM3 (ref 0–0.4)
EOSINOPHIL NFR BLD AUTO: 1.6 % (ref 0.3–6.2)
ERYTHROCYTE [DISTWIDTH] IN BLOOD BY AUTOMATED COUNT: 13.9 % (ref 12.3–15.4)
GLOBULIN UR ELPH-MCNC: 2.8 GM/DL
GLUCOSE SERPL-MCNC: 101 MG/DL (ref 65–99)
GLUCOSE UR STRIP-MCNC: NEGATIVE MG/DL
HCT VFR BLD AUTO: 42.1 % (ref 34–46.6)
HGB BLD-MCNC: 14.2 G/DL (ref 12–15.9)
HGB UR QL STRIP.AUTO: NEGATIVE
HOLD SPECIMEN: NORMAL
HOLD SPECIMEN: NORMAL
IMM GRANULOCYTES # BLD AUTO: 0.03 10*3/MM3 (ref 0–0.05)
IMM GRANULOCYTES NFR BLD AUTO: 0.4 % (ref 0–0.5)
KETONES UR QL STRIP: NEGATIVE
LEUKOCYTE ESTERASE UR QL STRIP.AUTO: NEGATIVE
LIPASE SERPL-CCNC: 20 U/L (ref 13–60)
LYMPHOCYTES # BLD AUTO: 1.97 10*3/MM3 (ref 0.7–3.1)
LYMPHOCYTES NFR BLD AUTO: 28.9 % (ref 19.6–45.3)
MCH RBC QN AUTO: 29.3 PG (ref 26.6–33)
MCHC RBC AUTO-ENTMCNC: 33.7 G/DL (ref 31.5–35.7)
MCV RBC AUTO: 87 FL (ref 79–97)
MONOCYTES # BLD AUTO: 0.65 10*3/MM3 (ref 0.1–0.9)
MONOCYTES NFR BLD AUTO: 9.5 % (ref 5–12)
NEUTROPHILS NFR BLD AUTO: 4.01 10*3/MM3 (ref 1.7–7)
NEUTROPHILS NFR BLD AUTO: 59 % (ref 42.7–76)
NITRITE UR QL STRIP: NEGATIVE
NRBC BLD AUTO-RTO: 0 /100 WBC (ref 0–0.2)
PH UR STRIP.AUTO: 7 [PH] (ref 5–8)
PLATELET # BLD AUTO: 199 10*3/MM3 (ref 140–450)
PMV BLD AUTO: 9.1 FL (ref 6–12)
POTASSIUM SERPL-SCNC: 4 MMOL/L (ref 3.5–5.2)
PROT SERPL-MCNC: 7.5 G/DL (ref 6–8.5)
PROT UR QL STRIP: NEGATIVE
RBC # BLD AUTO: 4.84 10*6/MM3 (ref 3.77–5.28)
SODIUM SERPL-SCNC: 137 MMOL/L (ref 136–145)
SP GR UR STRIP: 1.02 (ref 1–1.03)
UROBILINOGEN UR QL STRIP: NORMAL
WBC NRBC COR # BLD: 6.81 10*3/MM3 (ref 3.4–10.8)
WHOLE BLOOD HOLD COAG: NORMAL
WHOLE BLOOD HOLD SPECIMEN: NORMAL

## 2023-08-09 PROCEDURE — 25010000002 DROPERIDOL PER 5 MG: Performed by: EMERGENCY MEDICINE

## 2023-08-09 PROCEDURE — 25510000001 IOPAMIDOL 61 % SOLUTION: Performed by: EMERGENCY MEDICINE

## 2023-08-09 PROCEDURE — 74177 CT ABD & PELVIS W/CONTRAST: CPT

## 2023-08-09 PROCEDURE — 96374 THER/PROPH/DIAG INJ IV PUSH: CPT

## 2023-08-09 PROCEDURE — 81003 URINALYSIS AUTO W/O SCOPE: CPT | Performed by: EMERGENCY MEDICINE

## 2023-08-09 PROCEDURE — 83690 ASSAY OF LIPASE: CPT | Performed by: EMERGENCY MEDICINE

## 2023-08-09 PROCEDURE — 83605 ASSAY OF LACTIC ACID: CPT | Performed by: EMERGENCY MEDICINE

## 2023-08-09 PROCEDURE — 99285 EMERGENCY DEPT VISIT HI MDM: CPT

## 2023-08-09 PROCEDURE — 80053 COMPREHEN METABOLIC PANEL: CPT | Performed by: EMERGENCY MEDICINE

## 2023-08-09 PROCEDURE — 0 DIATRIZOATE MEGLUMINE & SODIUM PER 1 ML: Performed by: EMERGENCY MEDICINE

## 2023-08-09 PROCEDURE — 85025 COMPLETE CBC W/AUTO DIFF WBC: CPT | Performed by: EMERGENCY MEDICINE

## 2023-08-09 RX ORDER — SODIUM CHLORIDE 0.9 % (FLUSH) 0.9 %
10 SYRINGE (ML) INJECTION AS NEEDED
Status: DISCONTINUED | OUTPATIENT
Start: 2023-08-09 | End: 2023-08-09 | Stop reason: HOSPADM

## 2023-08-09 RX ORDER — ACETAMINOPHEN 500 MG
1000 TABLET ORAL ONCE
Status: COMPLETED | OUTPATIENT
Start: 2023-08-09 | End: 2023-08-09

## 2023-08-09 RX ORDER — DROPERIDOL 2.5 MG/ML
2.5 INJECTION, SOLUTION INTRAMUSCULAR; INTRAVENOUS ONCE
Status: COMPLETED | OUTPATIENT
Start: 2023-08-09 | End: 2023-08-09

## 2023-08-09 RX ADMIN — DIATRIZOATE MEGLUMINE AND DIATRIZOATE SODIUM 30 ML: 660; 100 LIQUID ORAL; RECTAL at 13:09

## 2023-08-09 RX ADMIN — DROPERIDOL 2.5 MG: 2.5 INJECTION, SOLUTION INTRAMUSCULAR; INTRAVENOUS at 10:55

## 2023-08-09 RX ADMIN — IOPAMIDOL 100 ML: 612 INJECTION, SOLUTION INTRAVENOUS at 13:09

## 2023-08-09 RX ADMIN — ACETAMINOPHEN 1000 MG: 500 TABLET ORAL at 10:55

## 2023-08-09 RX ADMIN — SODIUM CHLORIDE 1000 ML: 9 INJECTION, SOLUTION INTRAVENOUS at 10:54

## 2023-08-09 NOTE — ED NOTES
Fidelia, Charge RN at bedside discharging patient. Stated patient refused any discharge vitals and refused to take discharge paperwork. Follow up instructions discussed.

## 2023-08-09 NOTE — ED PROVIDER NOTES
HPI: Ashley Fried is a 43 y.o. female who presents to the emergency department complaining of abdominal pain, bloody stools.  Patient states that a month or so ago she swallowed 8 pens because she has pica.  She states that she has not passed them and has not had issues until several days ago started developing abdominal pain.  She also has been passing maroon-colored stools.  She states that today she has been unable to eat and has vomited blood as well.  Patient was just seen in the ER for same complaints and had a scan that confirmed she had 3 foreign bodies 2 of which were in the stomach and 1 of which in the ileum.  It was felt that patient could be managed conservatively and was scheduled to see GI today.  She states that she could not wait so came in for evaluation here.  Also of note patient was told that if she had issues she should go to [facility] but could not because she could not find a ride.      REVIEW OF SYSTEMS: All other systems reviewed and are negative     PAST MEDICAL HISTORY:   Past Medical History:   Diagnosis Date    Cancer     OVARIAN AND BLADDER        FAMILY HISTORY:    withHistory reviewed. No pertinent family history.     SOCIAL HISTORY:   Social History     Socioeconomic History    Marital status:    Tobacco Use    Smoking status: Every Day     Packs/day: 1.00     Years: 25.00     Pack years: 25.00     Types: Cigarettes    Smokeless tobacco: Never   Vaping Use    Vaping Use: Never used   Substance and Sexual Activity    Alcohol use: Not Currently     Comment: Soder since 5-19-23    Drug use: Not Currently    Sexual activity: Defer        SURGICAL HISTORY:   Past Surgical History:   Procedure Laterality Date    BREAST SURGERY      ENDOSCOPY N/A 6/6/2023    Procedure: ESOPHAGOGASTRODUODENOSCOPY WITH FOREIGN BODY REMOVAL;  Surgeon: Jarrod Corrigan MD;  Location: [facility] MAIN OR;  Service: Gastroenterology;  Laterality: N/A;  FOUR INK PEN CENTERS (STRAWS)     HYSTERECTOMY          ALLERGIES: Bee venom, Menadiol sodium diphosphate, Penicillins, and Chlordiazepoxide       PHYSICAL EXAM:   VITAL SIGNS:   Vitals:    08/09/23 1330   BP: 147/81   Pulse: 53   Resp:    Temp:    SpO2: 96%      CONSTITUTIONAL: Awake, well appearing, nontoxic   HENT: Atraumatic, normocephalic, oral mucosa moist, airway patent. Nares patent without drainage. External ears normal.   EYES: Conjunctivae clear, EOMI, PERRL   NECK: Trachea midline, nontender, supple   CARDIOVASCULAR: Normal heart rate, Normal rhythm.  PULMONARY/CHEST: Normal work of breathing. Clear to auscultation, no rhonchi, wheezes, or rales.  ABDOMINAL: Nondistended, soft, nontender, no rebound or guarding.  NEUROLOGIC: Nonfocal, moves all four extremities, no gross sensory or motor deficits.   EXTREMITIES: No clubbing, cyanosis, or edema   SKIN: Warm, Dry, No erythema, No rash       ED COURSE / MEDICAL DECISION MAKING:     Ashley Fried is a 43 y.o. female who presents to the emergency department for evaluation of abdominal pain, bloody bowel movement, swallowed foreign body.  Well-developed, well-nourished lady in no distress with exam as above.  Her vital signs are notable for mild hypertension.  Her abdominal exam is benign.  Her oxygen saturation is normal on room air at 96%.  Will obtain repeat labs and imaging.  Will give symptomatic treatment.  Disposition pending.    Differential diagnosis includes swallowed foreign body, bowel perforation, GI bleed, anemia, chronic pain among other etiologies.    Lab work is stable.  CT scan reveals foreign bodies in the stomach and ileum, interestingly they note that there four foreign bodies today as opposed to three 2 days ago.  Patient is adamant she has not swallowed any other foreign bodies.  Nevertheless there is no sign of obstruction or perforation.  I do feel she is safe for discharge home, she has close follow-up with GI which I encouraged her to keep.  She is agreeable with  plan of care.    Final diagnoses:   Swallowed foreign body, initial encounter        Joe Santana MD  08/09/23 2823

## 2023-08-14 ENCOUNTER — TELEPHONE (OUTPATIENT)
Dept: GASTROENTEROLOGY | Facility: CLINIC | Age: 43
End: 2023-08-14
Payer: COMMERCIAL

## 2023-08-14 NOTE — TELEPHONE ENCOUNTER
----- Message from Blanka Hendrix MA sent at 8/11/2023 11:02 AM EDT -----  Regarding: RE: swallowed pens  Called and left message for patient to call back.   ----- Message -----  From: Maya Valenzuela MA  Sent: 8/10/2023   9:09 AM EDT  To: Blanka Hendrix MA  Subject: FW: swallowed pens                                 ----- Message -----  From: Charo Gomez PA-C  Sent: 8/10/2023   8:35 AM EDT  To: Liliana Guy MD, #  Subject: swallowed pens                                   This pt missed her appt with me yesterday in office. She was seen at [facility] ED again yesterday. They repeated the scan, no signs of obstruction. Reports now has 2 pens in small bowel just prior to anastomosis and 2 pens in the stomach.     If we can reach the pt, go ahead and schedule her for op EGD on Monday or Tuesday.

## 2023-08-14 NOTE — TELEPHONE ENCOUNTER
Called the patient to offer her an EGD appt, spoke with patient.  She is not interested in scheduling an EGD appointment.

## (undated) DEVICE — Device: Brand: DISPOSABLE ELECTROSURGICAL SNARE

## (undated) DEVICE — CONMED SCOPE SAVER BITE BLOCK, 20X27 MM: Brand: SCOPE SAVER

## (undated) DEVICE — OVERTUBE ENDO 25CM 19.5MM GUARDUS STD

## (undated) DEVICE — SOL IRRG H2O PL/BG 1000ML STRL

## (undated) DEVICE — BLCK/BITE BLOX WO/DENTL/RIM W/STRAP 54F

## (undated) DEVICE — SNAR E/S POLYP SNAREMASTER OVL/10MM 2.8X2300MM YEL

## (undated) DEVICE — FRCP GRASP RESCUE RETRV RAT/TOOTH 7X230MM 1P/U

## (undated) DEVICE — Device: Brand: DEFENDO AIR/WATER/SUCTION AND BIOPSY VALVE

## (undated) DEVICE — SINGLE-USE BIOPSY FORCEPS: Brand: RADIAL JAW 4

## (undated) DEVICE — CONN JET HYDRA H20 AUXILIARY DISP

## (undated) DEVICE — ENDO CARRY-ON PROCEDURE KIT INCLUDES SUCTION TUBING, LUBRICANT, GAUZE, BIOHAZARD STICKER, TRANSPORT PAD AND INTERCEPT BEDSIDE KIT.: Brand: ENDO CARRY-ON PROCEDURE KIT

## (undated) DEVICE — THE DISPOSABLE RAPTOR GRASPING DEVICE IS USED TO GRASP TISSUE AND/OR RETRIEVE FOREIGN BODIES, EXCISED TISSUE AND STENTS DURING ENDOSCOPIC PROCEDURES.: Brand: RAPTOR

## (undated) DEVICE — SOLIDIFIER LIQLOC PLS 1500CC BT

## (undated) DEVICE — Device

## (undated) DEVICE — LINER SURG CANSTR SXN S/RIGD 1500CC

## (undated) DEVICE — A FLEXIBLE MANUAL DEVICE INTENDED TO BE USED IN COMBINATION WITH A COMPATIBLE ENDOSCOPE TO GRASP FOREIGN BODIES. IT IS CONSTRUCTED OF A FLEXIBLE METAL COIL AND PLASTIC TUBE WHOSE DISTAL END IS EQUIPPED WITH GRASPING TONGS WHICH ARE OPERATED BY A CONTROL HANDLE ATTACHED AT THE PROXIMAL END OF THE INSTRUMENT. IT IS INTRODUCED INTO THE BODY CAVITY THROUGH THE WORKING CHANNEL OF THE ENDOSCOPE. THIS IS A SINGLE-USE DEVICE.: Brand: HOBBS MEDICAL GRASPING FORCEPS

## (undated) DEVICE — THE DISPOSABLE ROTH NET FOREIGN BODY STANDARD RETRIEVAL DEVICE IS USED IN THE ENDOSCOPIC RETRIEVAL OF FOREIGN BODY, FOOD BOLUS AND EXCISED TISSUE SUCH AS POLYPS.: Brand: ROTH NET